# Patient Record
Sex: FEMALE | Race: WHITE | NOT HISPANIC OR LATINO | Employment: FULL TIME | URBAN - METROPOLITAN AREA
[De-identification: names, ages, dates, MRNs, and addresses within clinical notes are randomized per-mention and may not be internally consistent; named-entity substitution may affect disease eponyms.]

---

## 2017-03-11 DIAGNOSIS — F41.9 ANXIETY: ICD-10-CM

## 2017-03-13 RX ORDER — CITALOPRAM HYDROBROMIDE 10 MG/1
TABLET ORAL
Qty: 135 TABLET | Refills: 3 | OUTPATIENT
Start: 2017-03-13

## 2017-03-13 NOTE — TELEPHONE ENCOUNTER
Citalopram 10mg      Last Written Prescription Date:  7/19/16  Last Fill Quantity: 135,   # refills: 3  Last Office Visit with [facility] primary care provider:  7/15/16  Future Office visit: none    Refill request too soon, Rx denied.

## 2017-09-20 ENCOUNTER — OFFICE VISIT (OUTPATIENT)
Dept: OBGYN | Facility: CLINIC | Age: 47
End: 2017-09-20
Attending: OBSTETRICS & GYNECOLOGY
Payer: COMMERCIAL

## 2017-09-20 ENCOUNTER — RADIANT APPOINTMENT (OUTPATIENT)
Dept: MAMMOGRAPHY | Facility: CLINIC | Age: 47
End: 2017-09-20
Attending: OBSTETRICS & GYNECOLOGY
Payer: COMMERCIAL

## 2017-09-20 VITALS
BODY MASS INDEX: 21.97 KG/M2 | SYSTOLIC BLOOD PRESSURE: 110 MMHG | HEIGHT: 67 IN | WEIGHT: 140 LBS | DIASTOLIC BLOOD PRESSURE: 64 MMHG

## 2017-09-20 DIAGNOSIS — Z01.419 ENCOUNTER FOR GYNECOLOGICAL EXAMINATION WITHOUT ABNORMAL FINDING: Primary | ICD-10-CM

## 2017-09-20 DIAGNOSIS — Z13.29 SCREENING FOR THYROID DISORDER: ICD-10-CM

## 2017-09-20 DIAGNOSIS — G25.81 RESTLESS LEG SYNDROME: ICD-10-CM

## 2017-09-20 DIAGNOSIS — Z12.31 VISIT FOR SCREENING MAMMOGRAM: ICD-10-CM

## 2017-09-20 DIAGNOSIS — F41.9 ANXIETY: ICD-10-CM

## 2017-09-20 DIAGNOSIS — N92.0 POLYMENORRHEA: ICD-10-CM

## 2017-09-20 DIAGNOSIS — E55.9 VITAMIN D DEFICIENCY: ICD-10-CM

## 2017-09-20 DIAGNOSIS — Z13.6 SCREENING FOR CARDIOVASCULAR CONDITION: ICD-10-CM

## 2017-09-20 DIAGNOSIS — Z13.228 SCREENING FOR METABOLIC DISORDER: ICD-10-CM

## 2017-09-20 LAB
ERYTHROCYTE [DISTWIDTH] IN BLOOD BY AUTOMATED COUNT: 12.5 % (ref 10–15)
HCT VFR BLD AUTO: 38.6 % (ref 35–47)
HGB BLD-MCNC: 13.1 G/DL (ref 11.7–15.7)
MCH RBC QN AUTO: 31.3 PG (ref 26.5–33)
MCHC RBC AUTO-ENTMCNC: 33.9 G/DL (ref 31.5–36.5)
MCV RBC AUTO: 92 FL (ref 78–100)
PLATELET # BLD AUTO: 203 10E9/L (ref 150–450)
RBC # BLD AUTO: 4.18 10E12/L (ref 3.8–5.2)
WBC # BLD AUTO: 9.9 10E9/L (ref 4–11)

## 2017-09-20 PROCEDURE — 99396 PREV VISIT EST AGE 40-64: CPT | Performed by: OBSTETRICS & GYNECOLOGY

## 2017-09-20 PROCEDURE — 83540 ASSAY OF IRON: CPT | Performed by: OBSTETRICS & GYNECOLOGY

## 2017-09-20 PROCEDURE — 83550 IRON BINDING TEST: CPT | Performed by: OBSTETRICS & GYNECOLOGY

## 2017-09-20 PROCEDURE — 82728 ASSAY OF FERRITIN: CPT | Performed by: OBSTETRICS & GYNECOLOGY

## 2017-09-20 PROCEDURE — 80061 LIPID PANEL: CPT | Performed by: OBSTETRICS & GYNECOLOGY

## 2017-09-20 PROCEDURE — 85027 COMPLETE CBC AUTOMATED: CPT | Performed by: OBSTETRICS & GYNECOLOGY

## 2017-09-20 PROCEDURE — 84443 ASSAY THYROID STIM HORMONE: CPT | Performed by: OBSTETRICS & GYNECOLOGY

## 2017-09-20 PROCEDURE — 80053 COMPREHEN METABOLIC PANEL: CPT | Performed by: OBSTETRICS & GYNECOLOGY

## 2017-09-20 PROCEDURE — 36415 COLL VENOUS BLD VENIPUNCTURE: CPT | Performed by: OBSTETRICS & GYNECOLOGY

## 2017-09-20 PROCEDURE — G0202 SCR MAMMO BI INCL CAD: HCPCS | Mod: TC

## 2017-09-20 PROCEDURE — 82306 VITAMIN D 25 HYDROXY: CPT | Performed by: OBSTETRICS & GYNECOLOGY

## 2017-09-20 PROCEDURE — 77063 BREAST TOMOSYNTHESIS BI: CPT | Mod: TC

## 2017-09-20 RX ORDER — CITALOPRAM HYDROBROMIDE 10 MG/1
TABLET ORAL
Qty: 135 TABLET | Refills: 3 | Status: SHIPPED | OUTPATIENT
Start: 2017-09-20 | End: 2018-12-12

## 2017-09-20 ASSESSMENT — PATIENT HEALTH QUESTIONNAIRE - PHQ9
SUM OF ALL RESPONSES TO PHQ QUESTIONS 1-9: 1
5. POOR APPETITE OR OVEREATING: NOT AT ALL

## 2017-09-20 ASSESSMENT — ANXIETY QUESTIONNAIRES
1. FEELING NERVOUS, ANXIOUS, OR ON EDGE: NOT AT ALL
6. BECOMING EASILY ANNOYED OR IRRITABLE: NOT AT ALL
IF YOU CHECKED OFF ANY PROBLEMS ON THIS QUESTIONNAIRE, HOW DIFFICULT HAVE THESE PROBLEMS MADE IT FOR YOU TO DO YOUR WORK, TAKE CARE OF THINGS AT HOME, OR GET ALONG WITH OTHER PEOPLE: NOT DIFFICULT AT ALL
5. BEING SO RESTLESS THAT IT IS HARD TO SIT STILL: NOT AT ALL
GAD7 TOTAL SCORE: 0
2. NOT BEING ABLE TO STOP OR CONTROL WORRYING: NOT AT ALL
7. FEELING AFRAID AS IF SOMETHING AWFUL MIGHT HAPPEN: NOT AT ALL
3. WORRYING TOO MUCH ABOUT DIFFERENT THINGS: NOT AT ALL

## 2017-09-20 NOTE — MR AVS SNAPSHOT
"              After Visit Summary   9/20/2017    Mariana Peters    MRN: [id number]           Patient Information     Date Of Birth          1970        Visit Information        Provider Department      9/20/2017 11:20 AM Elba Worthy MD [facility]        Today's Diagnoses     Encounter for gynecological examination without abnormal finding    -  1    Anxiety        Polymenorrhea        Vitamin D deficiency        Restless leg syndrome        Screening for cardiovascular condition        Screening for metabolic disorder        Screening for thyroid disorder           Follow-ups after your visit        Who to contact     If you have questions or need follow up information about today's clinic visit or your schedule please contact [facility] directly at [phone].  Normal or non-critical lab and imaging results will be communicated to you by MyChart, letter or phone within 4 business days after the clinic has received the results. If you do not hear from us within 7 days, please contact the clinic through MyChart or phone. If you have a critical or abnormal lab result, we will notify you by phone as soon as possible.  Submit refill requests through [employer] or call your pharmacy and they will forward the refill request to us. Please allow 3 business days for your refill to be completed.          Additional Information About Your Visit        MyChart Information     [employer] lets you send messages to your doctor, view your test results, renew your prescriptions, schedule appointments and more. To sign up, go to [email or web address] . Click on \"Log in\" on the left side of the screen, which will take you to the Welcome page. Then click on \"Sign up Now\" on the right side of the page.     You will be asked to enter the access code listed below, as well as some personal information. Please follow the directions to create your username and password.     Your access " "code is: [email or web address]  Expires: 2017  6:37 PM     Your access code will  in 90 days. If you need help or a new code, please call your [facility] or [phone].        Care EveryWhere ID     This is your Care EveryWhere ID. This could be used by other organizations to access your [facility] medical records  [id number]        Your Vitals Were     Height Last Period BMI (Body Mass Index)             5' 7\" (1.702 m) 2017 (Exact Date) 21.93 kg/m2          Blood Pressure from Last 3 Encounters:   17 110/64   07/15/16 108/60    Weight from Last 3 Encounters:   17 140 lb (63.5 kg)   07/15/16 137 lb (62.1 kg)              We Performed the Following     CBC with platelets     Comprehensive metabolic panel     Ferritin     Iron and iron binding capacity     Lipid panel reflex to direct LDL     TSH with Free T4 Reflex     Vitamin D Deficiency          Today's Medication Changes          These changes are accurate as of: 17 11:59 PM.  If you have any questions, ask your nurse or doctor.               These medicines have changed or have updated prescriptions.        Dose/Directions    citalopram 10 MG tablet   Commonly known as:  celeXA   This may have changed:  See the new instructions.   Used for:  Anxiety   Changed by:  Elba Worthy MD        TAKE ONE AND ONE-HALF TABLETS BY MOUTH DAILY.   Quantity:  135 tablet   Refills:  3       metroNIDAZOLE 0.75 % cream   Commonly known as:  METROCREAM   This may have changed:  Another medication with the same name was removed. Continue taking this medication, and follow the directions you see here.   Changed by:  Elba Worthy MD        Refills:  0         Stop taking these medicines if you haven't already. Please contact your care team if you have questions.     Azelaic Acid 15 % gel   Stopped by:  Elba Worthy MD                Where to get your medicines      These medications were sent to [facility]/pharmacy #[id number] - [place], [place] - " [address]  [address], [place] [address]     Phone:  [phone]     citalopram 10 MG tablet                Primary Care Provider    None Specified       No primary provider on file.        Equal Access to Services     FERN JUAREZ : Hadii aad ku hadkaylee Sonena, waaxda luqadaha, qaybta kaalmada adeegyada, josé miguel parham rororavi camara kris cook. So [facility] [phone].    ATENCIÓN: Si habla español, tiene a canchola disposición servicios gratuitos de asistencia lingüística. Llame al [phone].    We comply with applicable federal civil rights laws and [place] laws. We do not discriminate on the basis of race, color, national origin, age, disability sex, sexual orientation or gender identity.            Thank you!     Thank you for choosing [facility]  for your care. Our goal is always to provide you with excellent care. Hearing back from our patients is one way we can continue to improve our services. Please take a few minutes to complete the written survey that you may receive in the mail after your visit with us. Thank you!             Your Updated Medication List - Protect others around you: Learn how to safely use, store and throw away your medicines at www.disposemymeds.org.          This list is accurate as of: 9/20/17 11:59 PM.  Always use your most recent med list.                   Brand Name Dispense Instructions for use Diagnosis    CALCIUM-VITAMIN D PO           citalopram 10 MG tablet    celeXA    135 tablet    TAKE ONE AND ONE-HALF TABLETS BY MOUTH DAILY.    Anxiety       MAGNESIUM PO           metroNIDAZOLE 0.75 % cream    METROCREAM          PROBIOTIC PO           VITAMIN B6 PO           VITAMIN C PO           VITAMIN D (CHOLECALCIFEROL) PO      Take 1,000 Units by mouth daily

## 2017-09-20 NOTE — PROGRESS NOTES
Mariana is a 46 year old  female who presents for annual exam.     Besides routine health maintenance,  she would like to discuss irregular periods.    HPI:  The patient doesn't have PCP.   Patient is doing a sabbatical from work right now and loving it. 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 Will do a little part time work next year and then start back when the  school year starts.    Is working out regularly, taking time to relax and connect with her sons and . Is doing a low carb challenge through her gym. No in order to lose weight but more b/c its really informative and she's learning a lot and is just being more aware of what she's eating.    Her anxiety is very well under control on her low dose citalopram and really prefers to just stay on it b/c in a really good spot with it.    Periods are coming every 21 days and had one or two that were only 19 days apart. Last for a full week. A day or two are heavy and then just spotting. Can deal with it if 21-23 days but was happening so close together and lasting a full week that almost had no break. Is a vegetarian so doesn't eat meat. Has been having RLS sx recently. Though it seems to be a bit better with magnesium and some of the supplements she's doing right now. Has no sx of fatigue or other issues but has had low vitamin D before so would like to have that checked as well.          GYNECOLOGIC HISTORY:    Patient's last menstrual period was 2017 (exact date).  Her current contraception method is: vasectomy.  She  reports that she has never smoked. She has never used smokeless tobacco.      Patient is sexually active.  STD testing offered?  Declined  Last PHQ-9 score on record =   PHQ-9 SCORE 2017   Total Score 1     Last GAD7 score on record =   TATIANA-7 SCORE 2017   Total Score 0     Alcohol Score = 4    HEALTH MAINTENANCE:  Cholesterol: None found.  Last  Mammo: 07/15/16, Result: normal, Next Mammo: today   Pap: 13 wnl, HPV-  Colonoscopy:  Never, Result: not applicable, Next Colonoscopy: 6 years.  Dexa:  Never    Health maintenance updated:  yes    HISTORY:  Obstetric History       T1      L2     SAB0   TAB0   Ectopic0   Multiple0   Live Births2       # Outcome Date GA Lbr Grant/2nd Weight Sex Delivery Anes PTL Lv   2 Term 04 39w0d  7 lb 10 oz (3.459 kg) M -SEC  N KELLE      Name: Oneil Smith  10/09/01 36w0d  6 lb 2 oz (2.778 kg) M -SEC  Y KELLE      Name: Hebert      Complications: Fetal Intolerance          Patient Active Problem List   Diagnosis     Rosacea     Anxiety     Past Surgical History:   Procedure Laterality Date      SECTION       CHOLECYSTECTOMY       HEMORRHOIDECTOMY       HERNIA REPAIR        Social History   Substance Use Topics     Smoking status: Never Smoker     Smokeless tobacco: Never Used     Alcohol use 0.0 oz/week     0 Standard drinks or equivalent per week      Problem (# of Occurrences) Relation (Name,Age of Onset)    Colon Cancer (1) Paternal Grandmother    DIABETES (1) Mother    Hyperlipidemia (1) Father    Hypertension (1) Father    Other - See Comments (1) Mother: hip fx            Current Outpatient Prescriptions   Medication Sig     metroNIDAZOLE (METROCREAM) 0.75 % cream      Pyridoxine HCl (VITAMIN B6 PO)      Probiotic Product (PROBIOTIC PO)      MAGNESIUM PO      Ascorbic Acid (VITAMIN C PO)      CALCIUM-VITAMIN D PO      citalopram (CELEXA) 10 MG tablet TAKE ONE AND ONE-HALF TABLETS BY MOUTH DAILY.     VITAMIN D, CHOLECALCIFEROL, PO Take 1,000 Units by mouth daily     No current facility-administered medications for this visit.      No Known Allergies    Past medical, surgical, social and family histories were reviewed and updated in EPIC.    ROS:   12 point review of systems negative other than symptoms noted below.  Breast: Tenderness  Genitourinary: Cramps, Heavy  "Bleeding with Period and Night Sweats  Endocrine: Decreased Libido  Psychiatric: Difficulty Sleeping    EXAM:  /64  Ht 5' 7\" (1.702 m)  Wt 140 lb (63.5 kg)  LMP 09/12/2017 (Exact Date)  BMI 21.93 kg/m2   BMI: Body mass index is 21.93 kg/(m^2).    PHYSICAL EXAM:  Constitutional:  Appearance: Well nourished, well developed, alert, in no acute distress  Neck:  Lymph Nodes:  No lymphadenopathy present    Thyroid:  Gland size normal, nontender, no nodules or masses present  on palpation  Chest:  Respiratory Effort:  Breathing unlabored  Cardiovascular:    Heart: Auscultation:  Regular rate, normal rhythm, no murmurs present  Breasts: Palpation of Breasts and Axillae:  No masses present on palpation, no breast tenderness. and No nodularity, asymmetry or nipple discharge bilaterally.  Gastrointestinal:   Abdominal Examination:  Abdomen nontender to palpation, tone normal without rigidity or guarding, no masses present, umbilicus without lesions   Liver and Spleen:  No hepatomegaly present, liver nontender to palpation    Hernias:  No hernias present  Lymphatic: Lymph Nodes:  No other lymphadenopathy present  Skin:  General Inspection:  No rashes present, no lesions present, no areas of  discoloration    Genitalia and Groin:  No rashes present, no lesions present, no areas of  discoloration, no masses present  Neurologic/Psychiatric:    Mental Status:  Oriented X3     Pelvic Exam:  External Genitalia:     Normal appearance for age, no discharge present, no tenderness present, no inflammatory lesions present, color normal  Vagina:     Normal vaginal vault without central or paravaginal defects, no discharge present, no inflammatory lesions present, no masses present  Bladder:     Nontender to palpation  Urethra:   Urethral Body:  Urethra palpation normal, urethra structural support normal   Urethral Meatus:  No erythema or lesions present  Cervix:     Appearance healthy, no lesions present, nontender to palpation, " no bleeding present  Uterus:     Uterus: firm, normal sized and nontender, anteverted in position.   Adnexa:     No adnexal tenderness present, no adnexal masses present  Perineum:     Perineum within normal limits, no evidence of trauma, no rashes or skin lesions present  Anus:     Anus within normal limits, no hemorrhoids present  Inguinal Lymph Nodes:     No lymphadenopathy present  Pubic Hair:     Normal pubic hair distribution for age  Genitalia and Groin:     No rashes present, no lesions present, no areas of discoloration, no masses present      COUNSELING:   Reviewed preventive health counseling, as reflected in patient instructions    BMI: Body mass index is 21.93 kg/(m^2).        ASSESSMENT:  46 year old female with satisfactory annual exam.  ICD-10-CM    1. Encounter for gynecological examination without abnormal finding Z01.419    2. Anxiety F41.9 citalopram (CELEXA) 10 MG tablet   3. Polymenorrhea N92.0    4. Vitamin D deficiency E55.9 Vitamin D Deficiency   5. Restless leg syndrome G25.81 CBC with platelets     Iron and iron binding capacity     Ferritin   6. Screening for cardiovascular condition Z13.6 Lipid panel reflex to direct LDL   7. Screening for metabolic disorder Z13.228 Comprehensive metabolic panel   8. Screening for thyroid disorder Z13.29 TSH with Free T4 Reflex       PLAN:  Pap is due next year and is having her mammo today    Will check her CBC and iron studies to make sure that the frequent and week long menses aren't leading to iron deficiency and anemia which can contribute to RLS as well. If normal then can live with the periods as they are at this point and just more of an inconvenience.    If normal iron and still getting RLS would encourage hydration, heat/calf massage prior to bed, and possibly could try benadryl as well. Will also check a vitamin D to make sure that's ok.    Refill sent on citalopram for the year.    Elba Worthy MD

## 2017-09-21 LAB
ALBUMIN SERPL-MCNC: 4.3 G/DL (ref 3.4–5)
ALP SERPL-CCNC: 57 U/L (ref 40–150)
ALT SERPL W P-5'-P-CCNC: 24 U/L (ref 0–50)
ANION GAP SERPL CALCULATED.3IONS-SCNC: 9 MMOL/L (ref 3–14)
AST SERPL W P-5'-P-CCNC: 16 U/L (ref 0–45)
BILIRUB SERPL-MCNC: 1 MG/DL (ref 0.2–1.3)
BUN SERPL-MCNC: 13 MG/DL (ref 7–30)
CALCIUM SERPL-MCNC: 8.8 MG/DL (ref 8.5–10.1)
CHLORIDE SERPL-SCNC: 106 MMOL/L (ref 94–109)
CHOLEST SERPL-MCNC: 175 MG/DL
CO2 SERPL-SCNC: 24 MMOL/L (ref 20–32)
CREAT SERPL-MCNC: 0.73 MG/DL (ref 0.52–1.04)
DEPRECATED CALCIDIOL+CALCIFEROL SERPL-MC: 36 UG/L (ref 20–75)
FERRITIN SERPL-MCNC: 35 NG/ML (ref 12–150)
GFR SERPL CREATININE-BSD FRML MDRD: 87 ML/MIN/1.7M2
GLUCOSE SERPL-MCNC: 77 MG/DL (ref 70–99)
HDLC SERPL-MCNC: 72 MG/DL
IRON SATN MFR SERPL: 34 % (ref 15–46)
IRON SERPL-MCNC: 107 UG/DL (ref 35–180)
LDLC SERPL CALC-MCNC: 94 MG/DL
NONHDLC SERPL-MCNC: 103 MG/DL
POTASSIUM SERPL-SCNC: 4.4 MMOL/L (ref 3.4–5.3)
PROT SERPL-MCNC: 7.3 G/DL (ref 6.8–8.8)
SODIUM SERPL-SCNC: 139 MMOL/L (ref 133–144)
TIBC SERPL-MCNC: 316 UG/DL (ref 240–430)
TRIGL SERPL-MCNC: 47 MG/DL
TSH SERPL DL<=0.005 MIU/L-ACNC: 1.33 MU/L (ref 0.4–4)

## 2017-09-21 ASSESSMENT — ANXIETY QUESTIONNAIRES: GAD7 TOTAL SCORE: 0

## 2018-02-07 ENCOUNTER — TELEPHONE (OUTPATIENT)
Dept: NURSING | Facility: CLINIC | Age: 48
End: 2018-02-07

## 2018-02-07 NOTE — TELEPHONE ENCOUNTER
"Received form \"Response Requested: Alternative Requested\" from [facility] for Citalopram HBR 10 mg tablet (take 1.5 tabs by mouth daily):      \"Alternative requested: Plane limits per insurance; max of 1 tab/day. No other info given to pharm. Please call insurance [phone]. ID# [id number]. Thanks\"        Routing to JARRETT Kiran.    "

## 2018-02-08 NOTE — TELEPHONE ENCOUNTER
PA Initiation    Medication: celexa 10mg tab - INITIATED  Insurance Company: [employer] [place] - Phone [phone] Fax [phone]  Pharmacy Filling the Rx: [id number] - [place], [place] - [address]  Filling Pharmacy Phone: [phone]  Filling Pharmacy Fax:    Start Date: 2/8/2018

## 2018-02-08 NOTE — TELEPHONE ENCOUNTER
Pharmacist Autumn calling to ask about where this process is at for the pt. Informed it was sent to you ANA PRESCOTT. She stated understanding and will await response.

## 2018-02-09 NOTE — TELEPHONE ENCOUNTER
Prior Authorization Approval    Authorization Effective Date: 1/30/2018  Authorization Expiration Date: 2/6/2019  Medication: celexa 10mg tab - pa approved   Reference #: certification number- [id number]   Insurance Company: [employer] [place] - Phone [phone] Fax [phone]  Expected CoPay: 25.65      Which Pharmacy is filling the prescription (Not needed for infusion/clinic administered): [id number] - [place], [place] - [address]  Pharmacy Notified: Yes  Patient Notified: Yes

## 2018-12-12 DIAGNOSIS — F41.9 ANXIETY: ICD-10-CM

## 2018-12-13 RX ORDER — CITALOPRAM HYDROBROMIDE 10 MG/1
TABLET ORAL
Qty: 45 TABLET | Refills: 0 | Status: SHIPPED | OUTPATIENT
Start: 2018-12-13 | End: 2019-01-16

## 2018-12-13 NOTE — TELEPHONE ENCOUNTER
"Requested Prescriptions   Pending Prescriptions Disp Refills     citalopram (CELEXA) 10 MG tablet [Pharmacy Med Name: CITALOPRAM 10MG TABLETS] 135 tablet 0     Sig: TAKE 1 AND 1/2 TABLET BY MOUTH DAILY    SSRIs Protocol Failed - 12/12/2018  5:34 PM       Failed - Recent (12 mo) or future (30 days) visit within the authorizing provider's specialty    Patient had office visit in the last 12 months or has a visit in the next 30 days with authorizing provider or within the authorizing provider's specialty.  See \"Patient Info\" tab in inbasket, or \"Choose Columns\" in Meds & Orders section of the refill encounter.             Passed - Patient is age 18 or older       Passed - No active pregnancy on record       Passed - No positive pregnancy test in last 12 months        Last Written Prescription Date:  9/20/2017  Last Fill Quantity: 135,  # refills: 3   Last office visit: No previous visit found with prescribing provider:  Zaynab   Future Office Visit: none scheduled    Medication is being filled for 1 time refill only due to:  Patient needs to be seen because it has been more than one year since last visit.   Called pt and left detailed vm reminding pt overdue for annual exam and med check and to call clinic and speak with a  to schedule exam. 1 month refill sent to pharmacy.      "

## 2019-01-15 DIAGNOSIS — F41.9 ANXIETY: ICD-10-CM

## 2019-01-15 RX ORDER — CITALOPRAM HYDROBROMIDE 10 MG/1
TABLET ORAL
Qty: 45 TABLET | Refills: 0 | OUTPATIENT
Start: 2019-01-15

## 2019-01-15 NOTE — TELEPHONE ENCOUNTER
"Requested Prescriptions   Pending Prescriptions Disp Refills     citalopram (CELEXA) 10 MG tablet [Pharmacy Med Name: CITALOPRAM 10MG TABLETS] 45 tablet 0     Sig: TAKE 1 AND 1/2 TABLETS BY MOUTH DAILY    SSRIs Protocol Passed - 1/15/2019  4:34 PM       Passed - Recent (12 mo) or future (30 days) visit within the authorizing provider's specialty    Patient had office visit in the last 12 months or has a visit in the next 30 days with authorizing provider or within the authorizing provider's specialty.  See \"Patient Info\" tab in inbasket, or \"Choose Columns\" in Meds & Orders section of the refill encounter.             Passed - Medication is active on med list       Passed - Patient is age 18 or older       Passed - No active pregnancy on record       Passed - No positive pregnancy test in last 12 months      Last Written Prescription Date:  12/13/18  Last Fill Quantity: 45,  # refills: 0   Last office visit: No previous visit found with prescribing provider:  9/20/17   Future Office Visit:   Next 5 appointments (look out 90 days)    Jan 16, 2019 10:30 AM CST  PHYSICAL with Elba Worthy MD  [facility] ([facility]) [address] [place] [id number] [phone]       Pt has appointment 1/16/19. Refill denied.   "

## 2019-01-15 NOTE — PROGRESS NOTES
"  Mariana is a 48 year old  female who presents for annual exam.     Besides routine health maintenance,  she would like to discuss irr periods. .    HPI:  The patient's PCP is  No primary care provider on file..    Patient is doing very well other than period irregularity. Periods are on a \"rolling average of 25 days\" will have one cycle that's 19 days and then 29 days. Today is bleeding and it's only day 14 so not sure if this is a period or ovulation bleeding. Occasionally some night sweats but no daytime hot flashes. Sister went through menopause around 52 and didn't c/o vasomotor sx that much. Patient's mom was 42 and her periods just stopped but minimal sx. Patient is 10 yrs younger than her 4 other siblings so wasn't planned but then her mom had early menopause. Not overly worried about the periods as long as not a sign of uterine cancer.    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 Now back to [facility]. 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 Loves the whole spectrum.    Still working out regularly. Does a lot of lifting and has a lot of core work. Very normal, regular BMs and not constipated. However has a hemorrhoid. Not hurting or bleeding but had a thrombosed one a couple years ago and jsut wants to prevent that from happening    Sons are antonio in HS and 8th grade. One getting ready for HS and the other starting to think about colleges. Not sure if will stay local or not but thinks he'll be relatively close to home      GYNECOLOGIC HISTORY:    Patient's last menstrual period was 2019 (approximate).  Her current contraception method is: vasectomy.  She  reports that  has never smoked. she has never used smokeless tobacco.    Patient is sexually active.  STD testing offered?  Declined  Last PHQ-9 score on record =   PHQ-9 SCORE 2019   PHQ-9 Total Score 0     Last GAD7 score on record =   TATIANA-7 SCORE 2019   Total Score 1     Alcohol " Score = 3    HEALTH MAINTENANCE:  Cholesterol:   Cholesterol   Date Value Ref Range Status   2017 175 <200 mg/dL Final      Last Mammo: one year ago, Result: normal, Next Mammo: today   Pap:   NL HPV-  Colonoscopy:  NA, Result: not applicable, Next Colonoscopy: 4 years.  Dexa:  Never    Health maintenance updated:  yes    HISTORY:  Obstetric History       T1      L2     SAB0   TAB0   Ectopic0   Multiple0   Live Births2       # Outcome Date GA Lbr Grant/2nd Weight Sex Delivery Anes PTL Lv   2 Term 04 39w0d  3.459 kg (7 lb 10 oz) M -SEC  N KELLE      Name: Oneil Smith  10/09/01 36w0d  2.778 kg (6 lb 2 oz) M -SEC  Y KELLE      Name: Hebert      Complications: Fetal Intolerance          Patient Active Problem List   Diagnosis     Rosacea     Anxiety     Past Surgical History:   Procedure Laterality Date      SECTION       CHOLECYSTECTOMY       HEMORRHOIDECTOMY       HERNIA REPAIR        Social History     Tobacco Use     Smoking status: Never Smoker     Smokeless tobacco: Never Used   Substance Use Topics     Alcohol use: Yes     Alcohol/week: 0.0 oz      Problem (# of Occurrences) Relation (Name,Age of Onset)    Colon Cancer (1) Paternal Grandmother    Diabetes (1) Mother    Hyperlipidemia (1) Father    Hypertension (1) Father    Other - See Comments (1) Mother: hip fx            Current Outpatient Medications   Medication Sig     Ascorbic Acid (VITAMIN C PO)      CALCIUM-VITAMIN D PO      citalopram (CELEXA) 10 MG tablet TAKE 1 AND 1/2 TABLET BY MOUTH DAILY     hydrocortisone (ANUSOL-HC) 2.5 % cream Place rectally 2 times daily as needed for hemorrhoids     MAGNESIUM PO      metroNIDAZOLE (METROCREAM) 0.75 % cream      Probiotic Product (PROBIOTIC PO)      vitamin B-12 (CYANOCOBALAMIN) 100 MCG tablet Take 1,000 mcg by mouth daily     Pyridoxine HCl (VITAMIN B6 PO)      VITAMIN D, CHOLECALCIFEROL, PO Take 1,000 Units by mouth daily     No current  "facility-administered medications for this visit.      No Known Allergies    Past medical, surgical, social and family histories were reviewed and updated in EPIC.    ROS:   12 point review of systems negative other than symptoms noted below.  Head: Sore Throat  Genitourinary: Irregular Menses and Spotting    EXAM:  /78   Ht 1.695 m (5' 6.75\")   Wt 64.4 kg (142 lb)   LMP 01/02/2019 (Approximate)   Breastfeeding? No   BMI 22.41 kg/m     BMI: Body mass index is 22.41 kg/m .    PHYSICAL EXAM:  Constitutional:  Appearance: Well nourished, well developed, alert, in no acute distress  Neck:  Lymph Nodes:  No lymphadenopathy present    Thyroid:  Gland size normal, nontender, no nodules or masses present  on palpation  Chest:  Respiratory Effort:  Breathing unlabored  Cardiovascular:    Heart: Auscultation:  Regular rate, normal rhythm, no murmurs present  Breasts: Palpation of Breasts and Axillae:  No masses present on palpation, no breast tenderness. and No nodularity, asymmetry or nipple discharge bilaterally.  Gastrointestinal:   Abdominal Examination:  Abdomen nontender to palpation, tone normal without rigidity or guarding, no masses present, umbilicus without lesions   Liver and Spleen:  No hepatomegaly present, liver nontender to palpation    Hernias:  No hernias present  Lymphatic: Lymph Nodes:  No other lymphadenopathy present  Skin:  General Inspection:  No rashes present, no lesions present, no areas of  discoloration    Genitalia and Groin:  No rashes present, no lesions present, no areas of  discoloration, no masses present  Neurologic/Psychiatric:    Mental Status:  Oriented X3     Pelvic Exam:  External Genitalia:     Normal appearance for age, no discharge present, no tenderness present, no inflammatory lesions present, color normal  Vagina:     Normal vaginal vault without central or paravaginal defects, no discharge present, no inflammatory lesions present, no masses present  Bladder:  "    Nontender to palpation  Urethra:   Urethral Body:  Urethra palpation normal, urethra structural support normal   Urethral Meatus:  No erythema or lesions present  Cervix:     Appearance healthy, no lesions present, nontender to palpation, MENSTRUAL LEVEL bleeding present  Uterus:     Uterus: firm, normal sized and nontender, anteverted in position.   Adnexa:     No adnexal tenderness present, no adnexal masses present  Perineum:     Perineum within normal limits, no evidence of trauma, no rashes or skin lesions present  Anus:     Anus within normal limits, EXTERNAL, NONTHROMBOSED HEMORRHOID AT 7 OCLOCK  Inguinal Lymph Nodes:     No lymphadenopathy present  Pubic Hair:     Normal pubic hair distribution for age  Genitalia and Groin:     No rashes present, no lesions present, no areas of discoloration, no masses present      COUNSELING:   Reviewed preventive health counseling, as reflected in patient instructions  Special attention given to:        (Radha)menopause management    BMI: Body mass index is 22.41 kg/m .      ASSESSMENT:  48 year old female with satisfactory annual exam.    ICD-10-CM    1. Encounter for gynecological examination without abnormal finding Z01.419 Pap imaged thin layer screen with HPV - recommended age 30 - 65     HPV High Risk Types DNA Cervical   2. Anxiety F41.9 citalopram (CELEXA) 10 MG tablet   3. External hemorrhoids K64.4 hydrocortisone (ANUSOL-HC) 2.5 % cream   4. Lipid screening Z13.220    5. Screening for metabolic disorder Z13.228        PLAN:  Pap and cotesting done today  mammo today  Patient is doing alternating 20mg and 10mg citalopram rather than cutting them in 1/2 to do a 15mg daily average and happy to keep doing that  rx for 2.5% hydrocortisone for the hemorrhoid.   Plan fasting labs in 3-4 more years    Elba Worthy MD

## 2019-01-16 ENCOUNTER — OFFICE VISIT (OUTPATIENT)
Dept: OBGYN | Facility: CLINIC | Age: 49
End: 2019-01-16
Payer: COMMERCIAL

## 2019-01-16 ENCOUNTER — ANCILLARY PROCEDURE (OUTPATIENT)
Dept: MAMMOGRAPHY | Facility: CLINIC | Age: 49
End: 2019-01-16
Payer: COMMERCIAL

## 2019-01-16 VITALS
DIASTOLIC BLOOD PRESSURE: 78 MMHG | WEIGHT: 142 LBS | BODY MASS INDEX: 22.29 KG/M2 | HEIGHT: 67 IN | SYSTOLIC BLOOD PRESSURE: 110 MMHG

## 2019-01-16 DIAGNOSIS — Z01.419 ENCOUNTER FOR GYNECOLOGICAL EXAMINATION WITHOUT ABNORMAL FINDING: Primary | ICD-10-CM

## 2019-01-16 DIAGNOSIS — Z12.31 VISIT FOR SCREENING MAMMOGRAM: ICD-10-CM

## 2019-01-16 DIAGNOSIS — K64.4 EXTERNAL HEMORRHOIDS: ICD-10-CM

## 2019-01-16 DIAGNOSIS — F41.9 ANXIETY: ICD-10-CM

## 2019-01-16 PROCEDURE — 77063 BREAST TOMOSYNTHESIS BI: CPT | Mod: TC

## 2019-01-16 PROCEDURE — 99396 PREV VISIT EST AGE 40-64: CPT | Performed by: OBSTETRICS & GYNECOLOGY

## 2019-01-16 PROCEDURE — G0145 SCR C/V CYTO,THINLAYER,RESCR: HCPCS | Performed by: OBSTETRICS & GYNECOLOGY

## 2019-01-16 PROCEDURE — 87624 HPV HI-RISK TYP POOLED RSLT: CPT | Performed by: OBSTETRICS & GYNECOLOGY

## 2019-01-16 PROCEDURE — 77067 SCR MAMMO BI INCL CAD: CPT | Mod: TC

## 2019-01-16 RX ORDER — CITALOPRAM HYDROBROMIDE 10 MG/1
TABLET ORAL
Qty: 135 TABLET | Refills: 3 | Status: SHIPPED | OUTPATIENT
Start: 2019-01-16 | End: 2019-08-02 | Stop reason: DRUGHIGH

## 2019-01-16 ASSESSMENT — MIFFLIN-ST. JEOR: SCORE: 1312.77

## 2019-01-16 ASSESSMENT — ANXIETY QUESTIONNAIRES
3. WORRYING TOO MUCH ABOUT DIFFERENT THINGS: NOT AT ALL
2. NOT BEING ABLE TO STOP OR CONTROL WORRYING: NOT AT ALL
1. FEELING NERVOUS, ANXIOUS, OR ON EDGE: SEVERAL DAYS
6. BECOMING EASILY ANNOYED OR IRRITABLE: NOT AT ALL
7. FEELING AFRAID AS IF SOMETHING AWFUL MIGHT HAPPEN: NOT AT ALL
5. BEING SO RESTLESS THAT IT IS HARD TO SIT STILL: NOT AT ALL
GAD7 TOTAL SCORE: 1

## 2019-01-16 ASSESSMENT — PATIENT HEALTH QUESTIONNAIRE - PHQ9
SUM OF ALL RESPONSES TO PHQ QUESTIONS 1-9: 0
5. POOR APPETITE OR OVEREATING: NOT AT ALL

## 2019-01-16 NOTE — LETTER
January 25, 2019    Mariana AMADA Cruzes  [address]  [facility] [address]    Dear ,  This letter is regarding your recent Pap smear (cervical cancer screening) and Human Papillomavirus (HPV) test.  We are happy to inform you that your Pap smear result is normal. Cervical cancer is closely linked with certain types of HPV. Your results showed no evidence of high-risk HPV.  Therefore we recommend you return in 5 years for your next pap smear and HPV test.  You will still need to return to the clinic every year for an annual exam and other preventive tests.  If you have additional questions regarding this result, please call our registered nurse, Zaynab at [phone].  Sincerely,    Elba Worthy MD/lorraine

## 2019-01-17 ASSESSMENT — ANXIETY QUESTIONNAIRES: GAD7 TOTAL SCORE: 1

## 2019-01-18 LAB
COPATH REPORT: NORMAL
PAP: NORMAL

## 2019-01-21 LAB
FINAL DIAGNOSIS: NORMAL
HPV HR 12 DNA CVX QL NAA+PROBE: NEGATIVE
HPV16 DNA SPEC QL NAA+PROBE: NEGATIVE
HPV18 DNA SPEC QL NAA+PROBE: NEGATIVE
SPECIMEN DESCRIPTION: NORMAL
SPECIMEN SOURCE CVX/VAG CYTO: NORMAL

## 2019-05-06 ENCOUNTER — TELEPHONE (OUTPATIENT)
Dept: OBGYN | Facility: CLINIC | Age: 49
End: 2019-05-06

## 2019-05-06 DIAGNOSIS — F32.A DEPRESSION: Primary | ICD-10-CM

## 2019-05-06 NOTE — TELEPHONE ENCOUNTER
Prior Authorization Retail Medication Request  RENEWAL:  previously approved from 1/30/18 - 2/6/19    Medication/Dose: citalopram (CELEXA) 10 MG tablet  ICD code (if different than what is on RX):    Previously Tried and Failed:  Lower dose 10mg daily  Rationale:  Anxiety better controlled on 15 mg daily    Insurance Name:  [facility]  Insurance ID:  [id number]

## 2019-05-09 NOTE — TELEPHONE ENCOUNTER
PA Initiation    Medication: citalopram (CELEXA) 10 MG tablet  Insurance Company: [employer] - Phone [phone] Fax [phone]  Pharmacy Filling the Rx: [email or web address] [address] [place] [address] [address] & [facility]  Filling Pharmacy Phone: [phone]  Filling Pharmacy Fax:    Start Date: 5/9/2019    Central Prior Authorization Team   Phone: [phone]

## 2019-05-13 NOTE — TELEPHONE ENCOUNTER
Called Patient s insurance at [phone] and per representative they did receive the request and it is still currently under review. Their turnaround time is 4-5 days but can be up to 10 days but once a decision is made they will send us a fax and mail out a letter to the patient with the outcome as well.

## 2019-05-16 NOTE — TELEPHONE ENCOUNTER
PRIOR AUTHORIZATION DENIED    Medication: citalopram (CELEXA) 10 MG tablet    Denial Date: 5/16/2019    Denial Rational: Insurance only allows 1 per day, stating that this is above what is suggested by the FDA        Appeal Information:

## 2019-05-17 ENCOUNTER — TELEPHONE (OUTPATIENT)
Dept: NURSING | Facility: CLINIC | Age: 49
End: 2019-05-17

## 2019-05-17 RX ORDER — CITALOPRAM HYDROBROMIDE 20 MG/1
20 TABLET ORAL EVERY OTHER DAY
Qty: 45 TABLET | Refills: 0 | Status: SHIPPED | OUTPATIENT
Start: 2019-05-17 | End: 2019-08-02

## 2019-05-17 RX ORDER — CITALOPRAM HYDROBROMIDE 10 MG/1
10 TABLET ORAL EVERY OTHER DAY
Qty: 45 TABLET | Refills: 0 | Status: SHIPPED | OUTPATIENT
Start: 2019-05-17 | End: 2019-08-01

## 2019-05-17 NOTE — TELEPHONE ENCOUNTER
Patient calling for a clarification on her Citalopram Prescription, there are three listed int he system and she needs it clarified which one she should take, having trouble confirming with her pharmacy apparently she has a combination of 10 and 20 mg tabs that are use to make a dose of 15mg daily and this is confusion the pre-authorization process.    RN will flag provider and team in Epic for review, She is requesting a call back to clarify or re-do the pre authorization, she has enough to hold her over until Monday.    Meaghan Sanchez RN - [employer] Nurse Advisor  5/17/2019

## 2019-05-17 NOTE — TELEPHONE ENCOUNTER
LM to clarify RX for Citalopram. Original RX was written 1/6/19 #135 taking 10mg 1 1/2 tabs daily (15mg) and that this RX now needed PA and was denied due to quantity.     Reviewed chart note 1/6/19   Patient is doing alternating 20mg and 10mg citalopram rather than cutting them in 1/2 to do a 15mg daily average and happy to keep doing that.    Routing to Triage RN to send new RX's for 10mg and 20mg with alternating directions to see if able to get through.

## 2019-05-19 NOTE — TELEPHONE ENCOUNTER
This came to me directly from [facility] this weekend.  My note clearly states that patient is doing an alternating 20 mg with 10 mg rather than cutting pills in half and doing 15mg/day  If that's what she wants to keep doing I'm sure the PA is going to be a problem so we need a valid reason for her to be doing this.  O/w she needs to tell us what she wants to do. Just do a daily 10mg, just do a daily 20mg(my rec) or do 15mg in which case then we need to fill a 10mg rx for 135 pills every 3 months

## 2019-08-01 DIAGNOSIS — F32.A DEPRESSION: ICD-10-CM

## 2019-08-02 DIAGNOSIS — F32.A DEPRESSION: ICD-10-CM

## 2019-08-02 RX ORDER — CITALOPRAM HYDROBROMIDE 10 MG/1
TABLET ORAL
Qty: 45 TABLET | Refills: 0 | Status: SHIPPED | OUTPATIENT
Start: 2019-08-02 | End: 2019-11-03

## 2019-08-02 RX ORDER — CITALOPRAM HYDROBROMIDE 20 MG/1
TABLET ORAL
Qty: 45 TABLET | Refills: 1 | Status: SHIPPED | OUTPATIENT
Start: 2019-08-02 | End: 2020-02-03

## 2019-08-02 NOTE — TELEPHONE ENCOUNTER
"Requested Prescriptions   Pending Prescriptions Disp Refills     citalopram (CELEXA) 10 MG tablet [Pharmacy Med Name: CITALOPRAM 10MG TABLETS] 45 tablet 0     Sig: TAKE 1 TABLET BY MOUTH EVERY OTHER DAY ALTERNATING WITH 20 MG       SSRIs Protocol Failed - 8/1/2019  7:23 PM        Failed - PHQ-9 score less than 5 in past 6 months     Please review last PHQ-9 score.           Failed - Recent (6 mo) or future (30 days) visit within the authorizing provider's specialty     Patient had office visit in the last 6 months or has a visit in the next 30 days with authorizing provider or within the authorizing provider's specialty.  See \"Patient Info\" tab in inbasket, or \"Choose Columns\" in Meds & Orders section of the refill encounter.            Passed - Medication is active on med list        Passed - Patient is age 18 or older        Passed - No active pregnancy on record        Passed - No positive pregnancy test in last 12 months        Prescription approved per [facility] Refill Protocol.  Luciana Bo RN on 8/2/2019 at 8:38 AM    "

## 2019-11-03 DIAGNOSIS — F32.A DEPRESSION: ICD-10-CM

## 2019-11-04 ENCOUNTER — HEALTH MAINTENANCE LETTER (OUTPATIENT)
Age: 49
End: 2019-11-04

## 2019-11-04 RX ORDER — CITALOPRAM HYDROBROMIDE 10 MG/1
TABLET ORAL
Qty: 45 TABLET | Refills: 0 | Status: SHIPPED | OUTPATIENT
Start: 2019-11-04 | End: 2020-02-03

## 2019-11-04 NOTE — TELEPHONE ENCOUNTER
"Requested Prescriptions   Pending Prescriptions Disp Refills     citalopram (CELEXA) 10 MG tablet [Pharmacy Med Name: CITALOPRAM 10MG TABLETS] 45 tablet 0     Sig: TAKE 1 TABLET BY MOUTH EVERY OTHER DAY ALTERNATING WITH 20 MG       SSRIs Protocol Failed - 11/3/2019  1:14 PM        Failed - PHQ-9 score less than 5 in past 6 months     Please review last PHQ-9 score.           Failed - Recent (6 mo) or future (30 days) visit within the authorizing provider's specialty     Patient had office visit in the last 6 months or has a visit in the next 30 days with authorizing provider or within the authorizing provider's specialty.  See \"Patient Info\" tab in inbasket, or \"Choose Columns\" in Meds & Orders section of the refill encounter.            Passed - Medication is active on med list        Passed - Patient is age 18 or older        Passed - No active pregnancy on record        Passed - No positive pregnancy test in last 12 months        Last Written Prescription Date:  8/2/19  Last Fill Quantity: 45,  # refills: 1   Last office visit: 1/16/2019 with prescribing provider:  Elba Worthy   Future Office Visit:  none    Prescription approved per [facility] Refill Protocol.  Luciana Bo RN on 11/4/2019 at 12:41 PM    "

## 2020-02-02 DIAGNOSIS — F32.A DEPRESSION: ICD-10-CM

## 2020-02-03 RX ORDER — CITALOPRAM HYDROBROMIDE 20 MG/1
TABLET ORAL
Qty: 45 TABLET | Refills: 0 | Status: SHIPPED | OUTPATIENT
Start: 2020-02-03 | End: 2020-05-03

## 2020-02-03 RX ORDER — CITALOPRAM HYDROBROMIDE 10 MG/1
TABLET ORAL
Qty: 45 TABLET | Refills: 0 | Status: SHIPPED | OUTPATIENT
Start: 2020-02-03 | End: 2020-05-03

## 2020-02-03 NOTE — TELEPHONE ENCOUNTER
"Requested Prescriptions   Pending Prescriptions Disp Refills     citalopram (CELEXA) 10 MG tablet [Pharmacy Med Name: CITALOPRAM 10MG TABLETS] 45 tablet 0     Sig: TAKE 1 TABLET BY MOUTH EVERY OTHER DAY ALTERNATING WITH 20 MG       SSRIs Protocol Failed - 2/2/2020  3:12 AM        Failed - PHQ-9 score less than 5 in past 6 months     Please review last PHQ-9 score.           Failed - Recent (6 mo) or future (30 days) visit within the authorizing provider's specialty     Patient had office visit in the last 6 months or has a visit in the next 30 days with authorizing provider or within the authorizing provider's specialty.  See \"Patient Info\" tab in inbasket, or \"Choose Columns\" in Meds & Orders section of the refill encounter.            Passed - Medication is active on med list        Passed - Patient is age 18 or older        Passed - No active pregnancy on record        Passed - No positive pregnancy test in last 12 months        citalopram (CELEXA) 20 MG tablet [Pharmacy Med Name: CITALOPRAM 20MG TABLETS] 45 tablet 1     Sig: TAKE 1 TABLET BY MOUTH EVERY OTHER DAY ALTERNATING WITH 10 MG       SSRIs Protocol Failed - 2/2/2020  3:12 AM        Failed - PHQ-9 score less than 5 in past 6 months     Please review last PHQ-9 score.           Failed - Recent (6 mo) or future (30 days) visit within the authorizing provider's specialty     Patient had office visit in the last 6 months or has a visit in the next 30 days with authorizing provider or within the authorizing provider's specialty.  See \"Patient Info\" tab in inbasket, or \"Choose Columns\" in Meds & Orders section of the refill encounter.            Passed - Medication is active on med list        Passed - Patient is age 18 or older        Passed - No active pregnancy on record        Passed - No positive pregnancy test in last 12 months          citalopram (CELEXA) 10 MG tablet [Pharmacy Med Name: CITALOPRAM 10MG TABLETS]     Last Written Prescription Date:  " 11/04/2019  Last Fill Quantity: 45,  # refills: 0   Last office visit: 1/16/2019 with prescribing provider:  Dr. Worthy   Future Office Visit:        citalopram (CELEXA) 20 MG tablet [Pharmacy Med Name: CITALOPRAM 20MG TABLETS]   Last Written Prescription Date:  08/02/2019  Last Fill Quantity: 45,  # refills: 1   Last office visit: 1/16/2019 with prescribing provider:  Dr. Worthy   Future Office Visit:      Medication is being filled for 1 time refill only due to:  appointment needed   Luciana Bo RN on 2/3/2020 at 6:06 PM

## 2020-05-02 DIAGNOSIS — F32.A DEPRESSION: ICD-10-CM

## 2020-05-03 RX ORDER — CITALOPRAM HYDROBROMIDE 10 MG/1
TABLET ORAL
Qty: 45 TABLET | Refills: 0 | Status: SHIPPED | OUTPATIENT
Start: 2020-05-03 | End: 2020-07-28

## 2020-05-03 RX ORDER — CITALOPRAM HYDROBROMIDE 20 MG/1
TABLET ORAL
Qty: 45 TABLET | Refills: 0 | Status: SHIPPED | OUTPATIENT
Start: 2020-05-03 | End: 2020-07-28

## 2020-05-04 NOTE — TELEPHONE ENCOUNTER
"Requested Prescriptions   Pending Prescriptions Disp Refills     citalopram (CELEXA) 20 MG tablet [Pharmacy Med Name: CITALOPRAM 20MG TABLETS] 45 tablet 0     Sig: TAKE 1 TABLET BY MOUTH EVERY OTHER DAY ALTERNATING WITH 10 MG TABLET. APPOINTMENT NEEDED FOR FURTHER REFILLS.       SSRIs Protocol Failed - 5/2/2020  9:11 AM        Failed - PHQ-9 score less than 5 in past 6 months     Please review last PHQ-9 score.           Failed - Recent (6 mo) or future (30 days) visit within the authorizing provider's specialty     Patient had office visit in the last 6 months or has a visit in the next 30 days with authorizing provider or within the authorizing provider's specialty.  See \"Patient Info\" tab in inbasket, or \"Choose Columns\" in Meds & Orders section of the refill encounter.            Passed - Medication is active on med list        Passed - Patient is age 18 or older        Passed - No active pregnancy on record        Passed - No positive pregnancy test in last 12 months           citalopram (CELEXA) 10 MG tablet [Pharmacy Med Name: CITALOPRAM 10MG TABLETS] 45 tablet 0     Sig: TAKE 1 TABLET BY MOUTH EVERY OTHER DAY ALTERNATING WITH 20 MG TABLET. APPOINTMENT NEEDED FOR FURTHER REFILLS.       SSRIs Protocol Failed - 5/2/2020  9:11 AM        Failed - PHQ-9 score less than 5 in past 6 months     Please review last PHQ-9 score.           Failed - Recent (6 mo) or future (30 days) visit within the authorizing provider's specialty     Patient had office visit in the last 6 months or has a visit in the next 30 days with authorizing provider or within the authorizing provider's specialty.  See \"Patient Info\" tab in inbasket, or \"Choose Columns\" in Meds & Orders section of the refill encounter.            Passed - Medication is active on med list        Passed - Patient is age 18 or older        Passed - No active pregnancy on record        Passed - No positive pregnancy test in last 12 months           Citalopram 10mg and " 20mg - alternating days  Last Written Prescription Date:  2/3/20  Last Fill Quantity: 45,  # refills: 0   Last office visit: 1/16/2019 with prescribing provider:  DR Worthy   Future Office Visit:   Next 5 appointments (look out 90 days)    Jun 12, 2020 11:10 AM CDT  [facility] [address] [place] [id number]  [phone]         Prescription approved per [facility] Refill Protocol.  Nakita Siu RN on 5/3/2020 at 7:57 PM

## 2020-07-28 DIAGNOSIS — F32.A DEPRESSION: ICD-10-CM

## 2020-07-28 RX ORDER — CITALOPRAM HYDROBROMIDE 10 MG/1
10 TABLET ORAL EVERY OTHER DAY
Qty: 45 TABLET | Refills: 0 | Status: SHIPPED | OUTPATIENT
Start: 2020-07-28 | End: 2020-09-23

## 2020-07-28 RX ORDER — CITALOPRAM HYDROBROMIDE 20 MG/1
20 TABLET ORAL EVERY OTHER DAY
Qty: 45 TABLET | Refills: 0 | Status: SHIPPED | OUTPATIENT
Start: 2020-07-28 | End: 2020-09-23

## 2020-07-28 NOTE — TELEPHONE ENCOUNTER
"Requested Prescriptions   Pending Prescriptions Disp Refills     citalopram (CELEXA) 10 MG tablet [Pharmacy Med Name: CITALOPRAM 10MG TABLETS] 45 tablet 0     Sig: TAKE 1 TABLET BY MOUTH EVERY OTHER DAY ALTERNATING WITH 20 MG TABLET AS DIRECTED. NEEDS APPT FOR FURTHER REFILLS.       SSRIs Protocol Failed - 7/28/2020  1:09 PM        Failed - PHQ-9 score less than 5 in past 6 months     Please review last PHQ-9 score.           Failed - Recent (6 mo) or future (30 days) visit within the authorizing provider's specialty     Patient had office visit in the last 6 months or has a visit in the next 30 days with authorizing provider or within the authorizing provider's specialty.  See \"Patient Info\" tab in inbasket, or \"Choose Columns\" in Meds & Orders section of the refill encounter.            Passed - Medication is active on med list        Passed - Patient is age 18 or older        Passed - No active pregnancy on record        Passed - No positive pregnancy test in last 12 months           citalopram (CELEXA) 20 MG tablet [Pharmacy Med Name: CITALOPRAM 20MG TABLETS] 45 tablet 0     Sig: TAKE 1 TABLET BY MOUTH EVERY OTHER DAY ALTERNATING WITH 10 MG TABLET. APPT NEEDED FOR FURTHER REFILLS.       SSRIs Protocol Failed - 7/28/2020  1:09 PM        Failed - PHQ-9 score less than 5 in past 6 months     Please review last PHQ-9 score.           Failed - Recent (6 mo) or future (30 days) visit within the authorizing provider's specialty     Patient had office visit in the last 6 months or has a visit in the next 30 days with authorizing provider or within the authorizing provider's specialty.  See \"Patient Info\" tab in inbasket, or \"Choose Columns\" in Meds & Orders section of the refill encounter.            Passed - Medication is active on med list        Passed - Patient is age 18 or older        Passed - No active pregnancy on record        Passed - No positive pregnancy test in last 12 months           Last Written " Prescription Date:  5/3/20  Last Fill Quantity: 45,  # refills: 0   Last office visit: 1/16/2019 with prescribing provider:  Dr Worthy   Future Office Visit:   Next 5 appointments (look out 90 days)    Sep 23, 2020  3:00 PM CDT  PHYSICAL with Elba W[facility] MD  [facility] [address] [place] [id number]  [phone]         Prescription approved per [facility] [place]ill Protocol.  Nakita Siu RN on 7/28/2020 at 1:26 PM

## 2020-09-23 ENCOUNTER — ANCILLARY PROCEDURE (OUTPATIENT)
Dept: MAMMOGRAPHY | Facility: CLINIC | Age: 50
End: 2020-09-23
Attending: OBSTETRICS & GYNECOLOGY
Payer: COMMERCIAL

## 2020-09-23 ENCOUNTER — OFFICE VISIT (OUTPATIENT)
Dept: OBGYN | Facility: CLINIC | Age: 50
End: 2020-09-23
Attending: OBSTETRICS & GYNECOLOGY
Payer: COMMERCIAL

## 2020-09-23 VITALS
HEIGHT: 67 IN | HEART RATE: 60 BPM | WEIGHT: 151 LBS | BODY MASS INDEX: 23.7 KG/M2 | SYSTOLIC BLOOD PRESSURE: 110 MMHG | DIASTOLIC BLOOD PRESSURE: 78 MMHG

## 2020-09-23 DIAGNOSIS — G25.81 RESTLESS LEG SYNDROME: ICD-10-CM

## 2020-09-23 DIAGNOSIS — Z01.419 ENCOUNTER FOR GYNECOLOGICAL EXAMINATION WITHOUT ABNORMAL FINDING: Primary | ICD-10-CM

## 2020-09-23 DIAGNOSIS — F41.9 ANXIETY: ICD-10-CM

## 2020-09-23 DIAGNOSIS — Z23 NEED FOR PROPHYLACTIC VACCINATION AND INOCULATION AGAINST INFLUENZA: ICD-10-CM

## 2020-09-23 DIAGNOSIS — F32.5 DEPRESSION, MAJOR, IN REMISSION (H): ICD-10-CM

## 2020-09-23 DIAGNOSIS — N92.0 POLYMENORRHEA: ICD-10-CM

## 2020-09-23 DIAGNOSIS — Z12.11 SCREEN FOR COLON CANCER: ICD-10-CM

## 2020-09-23 DIAGNOSIS — Z12.31 VISIT FOR SCREENING MAMMOGRAM: ICD-10-CM

## 2020-09-23 LAB
ERYTHROCYTE [DISTWIDTH] IN BLOOD BY AUTOMATED COUNT: 12.4 % (ref 10–15)
HCT VFR BLD AUTO: 38.5 % (ref 35–47)
HGB BLD-MCNC: 12.7 G/DL (ref 11.7–15.7)
MCH RBC QN AUTO: 30.2 PG (ref 26.5–33)
MCHC RBC AUTO-ENTMCNC: 33 G/DL (ref 31.5–36.5)
MCV RBC AUTO: 91 FL (ref 78–100)
PLATELET # BLD AUTO: 217 10E9/L (ref 150–450)
RBC # BLD AUTO: 4.21 10E12/L (ref 3.8–5.2)
WBC # BLD AUTO: 7.3 10E9/L (ref 4–11)

## 2020-09-23 PROCEDURE — 82728 ASSAY OF FERRITIN: CPT | Performed by: OBSTETRICS & GYNECOLOGY

## 2020-09-23 PROCEDURE — 90471 IMMUNIZATION ADMIN: CPT | Performed by: OBSTETRICS & GYNECOLOGY

## 2020-09-23 PROCEDURE — 99213 OFFICE O/P EST LOW 20 MIN: CPT | Mod: 25 | Performed by: OBSTETRICS & GYNECOLOGY

## 2020-09-23 PROCEDURE — 99396 PREV VISIT EST AGE 40-64: CPT | Mod: 25 | Performed by: OBSTETRICS & GYNECOLOGY

## 2020-09-23 PROCEDURE — 77067 SCR MAMMO BI INCL CAD: CPT | Mod: TC

## 2020-09-23 PROCEDURE — 36415 COLL VENOUS BLD VENIPUNCTURE: CPT | Performed by: OBSTETRICS & GYNECOLOGY

## 2020-09-23 PROCEDURE — 85027 COMPLETE CBC AUTOMATED: CPT | Performed by: OBSTETRICS & GYNECOLOGY

## 2020-09-23 PROCEDURE — 77063 BREAST TOMOSYNTHESIS BI: CPT | Mod: TC

## 2020-09-23 PROCEDURE — 83540 ASSAY OF IRON: CPT | Performed by: OBSTETRICS & GYNECOLOGY

## 2020-09-23 PROCEDURE — 90686 IIV4 VACC NO PRSV 0.5 ML IM: CPT | Performed by: OBSTETRICS & GYNECOLOGY

## 2020-09-23 PROCEDURE — 83550 IRON BINDING TEST: CPT | Performed by: OBSTETRICS & GYNECOLOGY

## 2020-09-23 RX ORDER — CITALOPRAM HYDROBROMIDE 10 MG/1
10 TABLET ORAL EVERY OTHER DAY
Qty: 90 TABLET | Refills: 3 | Status: SHIPPED | OUTPATIENT
Start: 2020-09-23 | End: 2021-12-15

## 2020-09-23 RX ORDER — CITALOPRAM HYDROBROMIDE 20 MG/1
20 TABLET ORAL EVERY OTHER DAY
Qty: 90 TABLET | Refills: 3 | Status: SHIPPED | OUTPATIENT
Start: 2020-09-23 | End: 2021-12-15

## 2020-09-23 RX ORDER — METRONIDAZOLE 10 MG/G
GEL TOPICAL DAILY
COMMUNITY

## 2020-09-23 ASSESSMENT — ANXIETY QUESTIONNAIRES
6. BECOMING EASILY ANNOYED OR IRRITABLE: NOT AT ALL
1. FEELING NERVOUS, ANXIOUS, OR ON EDGE: SEVERAL DAYS
IF YOU CHECKED OFF ANY PROBLEMS ON THIS QUESTIONNAIRE, HOW DIFFICULT HAVE THESE PROBLEMS MADE IT FOR YOU TO DO YOUR WORK, TAKE CARE OF THINGS AT HOME, OR GET ALONG WITH OTHER PEOPLE: SOMEWHAT DIFFICULT
7. FEELING AFRAID AS IF SOMETHING AWFUL MIGHT HAPPEN: NOT AT ALL
2. NOT BEING ABLE TO STOP OR CONTROL WORRYING: SEVERAL DAYS
GAD7 TOTAL SCORE: 3
5. BEING SO RESTLESS THAT IT IS HARD TO SIT STILL: NOT AT ALL
3. WORRYING TOO MUCH ABOUT DIFFERENT THINGS: NOT AT ALL

## 2020-09-23 ASSESSMENT — PATIENT HEALTH QUESTIONNAIRE - PHQ9
SUM OF ALL RESPONSES TO PHQ QUESTIONS 1-9: 1
5. POOR APPETITE OR OVEREATING: SEVERAL DAYS

## 2020-09-23 ASSESSMENT — MIFFLIN-ST. JEOR: SCORE: 1356.52

## 2020-09-23 NOTE — PROGRESS NOTES
Mariana is a 49 year old  female who presents for annual exam.     Besides routine health maintenance, she has no other health concerns today .    HPI:  The patient's PCP is none.    Definitely has had a stressful year with covid. 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 Knocked out walls to make more open space, took a 5% salary cut, working at least 1-2 hours more per day,etc. Starting to settle in a little though    Older son went off to college at [employer]. Only freshmen on campus so far and some classes in person and some virtual but liking it after how bad of an end to senior year her had. Other son is 16 an a sophomore and doing hybrid 2 days one week and 3 days the next week    Citalopram at 30mg is working well after the bump. Doing a 20mg and a 10mg together and working really well.  Having some trouble sleeping though. Not anxiety related. Mostly just insomnia but also some RLS issues.  Periods were mostly 25 days apart last year and now this year states anywhere from 22 to 26 but more the 22-23. Last for 5-6 days. One day is heavier but a tampon will last a couple of hours on that day. No accidents and not having to wake up at night to change tampons. No vasomotor sx at this point    Fasting labs 3 yrs ago were normal. Not fasting this afternoon. Would like a flu shot. Now due for colonoscopy with age to start screening moved up. mammo today      GYNECOLOGIC HISTORY:    Patient's last menstrual period was 2020.    Regular menses? yes  Menses every 22-27 days.  Length of menses: 6 days    Her current contraception method is: vasectomy.  She  reports that she has never smoked. She has never used smokeless tobacco.    Patient is sexually active.  STD testing offered?  Declined  Last PHQ-9 score on record =   PHQ-9 SCORE 2020   PHQ-9 Total Score 1     Last GAD7 score on record =   TATIANA-7 SCORE 2020   Total Score 3     Alcohol Score = 2    HEALTH  MAINTENANCE:  Cholesterol:   Recent Labs   Lab Test 17  1225   CHOL 175   HDL 72   LDL 94   TRIG 47   GLUCOSE 77   TSH 1.33     Last Mammo: 2019, Result: Normal, Next Mammo: Today   Pap: HPV NEGATIVE  Lab Results   Component Value Date    PAP NIL 2019      Colonoscopy:  NA, Next Colonoscopy: DUE THIS YEAR.  Dexa:  NA    Health maintenance updated:  no, due for colonoscopy     HISTORY:  OB History    Para Term  AB Living   3 2 1 1 1 2   SAB TAB Ectopic Multiple Live Births   1 0 0 0 2      # Outcome Date GA Lbr Grant/2nd Weight Sex Delivery Anes PTL Lv   3 Term 04 39w0d  3.459 kg (7 lb 10 oz) M -SEC  N KELLE      Name: Oneil   2  10/09/01 36w0d  2.778 kg (6 lb 2 oz) M -SEC  Y KELLE      Birth Comments: bradycardia at 6-7cm. terb during pregnancy      Complications: Fetal Intolerance      Name: Hebert   1 SAB                Patient Active Problem List   Diagnosis     Rosacea     Anxiety     Past Surgical History:   Procedure Laterality Date      SECTION       CHOLECYSTECTOMY       HEMORRHOIDECTOMY       HERNIA REPAIR        Social History     Tobacco Use     Smoking status: Never Smoker     Smokeless tobacco: Never Used   Substance Use Topics     Alcohol use: Yes     Alcohol/week: 0.0 standard drinks      Problem (# of Occurrences) Relation (Name,Age of Onset)    Colon Cancer (1) Paternal Grandmother    Diabetes (1) Mother    Hyperlipidemia (1) Father    Hypertension (1) Father    Other - See Comments (1) Mother: hip fx            Current Outpatient Medications   Medication Sig     Ascorbic Acid (VITAMIN C PO)      CALCIUM-VITAMIN D PO      citalopram (CELEXA) 10 MG tablet Take 1 tablet (10 mg) by mouth every other day Alternating with 20 mg tablet     citalopram (CELEXA) 20 MG tablet Take 1 tablet (20 mg) by mouth every other day Alternating with 10 mg Tablet     MAGNESIUM PO      metroNIDAZOLE (METROGEL) 1 % external gel Apply topically daily      "VITAMIN D, CHOLECALCIFEROL, PO Take 1,000 Units by mouth daily     No current facility-administered medications for this visit.      No Known Allergies    Past medical, surgical, social and family histories were reviewed and updated in EPIC.    ROS:   12 point review of systems negative other than symptoms noted below or in the HPI.  No urinary frequency or dysuria, bladder or kidney problems, POSITIVE for:, irregular menses, heavy periods    EXAM:  /78 (BP Location: Right arm, Patient Position: Sitting, Cuff Size: Adult Regular)   Pulse 60   Ht 1.708 m (5' 7.25\")   Wt 68.5 kg (151 lb)   LMP 09/23/2020   Breastfeeding No   BMI 23.47 kg/m     BMI: Body mass index is 23.47 kg/m .    PHYSICAL EXAM:  Constitutional:   Appearance: Well nourished, well developed, alert, in no acute distress  Neck:  Lymph Nodes:  No lymphadenopathy present    Thyroid:  Gland size normal, nontender, no nodules or masses present  on palpation  Chest:  Respiratory Effort:  Breathing unlabored  Cardiovascular:    Heart: Auscultation:  Regular rate, normal rhythm, no murmurs present  Breasts: Palpation of Breasts and Axillae:  No masses present on palpation, no breast tenderness., Axillary Lymph Nodes:  No lymphadenopathy present. and No nodularity, asymmetry or nipple discharge bilaterally.  Gastrointestinal:   Abdominal Examination:  Abdomen nontender to palpation, tone normal without rigidity or guarding, no masses present, umbilicus without lesions   Liver and Spleen:  No hepatomegaly present, liver nontender to palpation    Hernias:  No hernias present  Lymphatic: Lymph Nodes:  No other lymphadenopathy present  Skin:  General Inspection:  No rashes present, no lesions present, no areas of  discoloration  Neurologic:    Mental Status:  Oriented X3.  Normal strength and tone, sensory exam                grossly normal, mentation intact and speech normal.    Psychiatric:   Mentation appears normal and affect " normal/bright.         Pelvic Exam:  External Genitalia:     Normal appearance for age, no discharge present, no tenderness present, no inflammatory lesions present, color normal  Vagina:     Normal vaginal vault without central or paravaginal defects, no discharge present, no inflammatory lesions present, no masses present  Bladder:     Nontender to palpation  Urethra:   Urethral Body:  Urethra palpation normal, urethra structural support normal   Urethral Meatus:  No erythema or lesions present  Cervix:     Appearance healthy, no lesions present, nontender to palpation,  MODERATE DARK RED bleeding present  Uterus:     Uterus: firm, normal sized and nontender, midplane in position.   Adnexa:     No adnexal tenderness present, no adnexal masses present  Perineum:     Perineum within normal limits, no evidence of trauma, no rashes or skin lesions present  Anus:     Anus within normal limits, no hemorrhoids present  Inguinal Lymph Nodes:     No lymphadenopathy present  Pubic Hair:     Normal pubic hair distribution for age  Genitalia and Groin:     No rashes present, no lesions present, no areas of discoloration, no masses present      COUNSELING:   Reviewed preventive health counseling, as reflected in patient instructions  Special attention given to:        (Radha)menopause management    BMI: Body mass index is 23.47 kg/m .      ASSESSMENT:  49 year old female with satisfactory annual exam.    ICD-10-CM    1. Encounter for gynecological examination without abnormal finding  Z01.419    2. Polymenorrhea  N92.0 Ferritin     Iron and iron binding capacity     CBC with platelets   3. Restless leg syndrome  G25.81 Ferritin     Iron and iron binding capacity     CBC with platelets   4. Anxiety  F41.9 citalopram (CELEXA) 10 MG tablet     citalopram (CELEXA) 20 MG tablet   5. Depression, major, in remission (H)  F32.5 citalopram (CELEXA) 10 MG tablet     citalopram (CELEXA) 20 MG tablet   6. Need for prophylactic vaccination  and inoculation against influenza  Z23 INFLUENZA VACCINE IM > 6 MONTHS VALENT IIV4 [41302]     Vaccine Administration, Initial [16380]   7. Screen for colon cancer  Z12.11 GASTROENTEROLOGY ADULT REF PROCEDURE ONLY       PLAN:  Pap is UTD for 3 yrs  mammo today  Flu shot today    Should plan fasting labs in the next 1-2 yrs  Refill citalopram 30mg for the year    Discussed polymenorrhea and though not overly heavy is having close together cycles that can lead to iron deficiency and subsequent RLS with sleep disturbance    Will check CBC and iron studies to assess need for iron replacement. Encouraged massing legs and heat prior to going to bed to improve circulation, supplementing iron if needed and dietary iron stores, and benadryl for both improved sleep but also can help with RLS as well w/o taking the side effects of parkinsons meds for RLS. Spent an additional 10 min with the patient on top of her annual,  100% of which was in face to face counseling time regarding polymenorrhea and RLS    Didn't discuss colonoscopy need and age change for screening today.  fhx of colon cancer in a PGM but unsure of age, so will send referral at this time    Elba Worthy MD

## 2020-09-24 LAB
FERRITIN SERPL-MCNC: 58 NG/ML (ref 8–252)
IRON SATN MFR SERPL: 23 % (ref 15–46)
IRON SERPL-MCNC: 77 UG/DL (ref 35–180)
TIBC SERPL-MCNC: 334 UG/DL (ref 240–430)

## 2020-09-24 ASSESSMENT — ANXIETY QUESTIONNAIRES: GAD7 TOTAL SCORE: 3

## 2021-09-18 ENCOUNTER — HEALTH MAINTENANCE LETTER (OUTPATIENT)
Age: 51
End: 2021-09-18

## 2021-11-13 ENCOUNTER — HEALTH MAINTENANCE LETTER (OUTPATIENT)
Age: 51
End: 2021-11-13

## 2022-01-08 ENCOUNTER — HEALTH MAINTENANCE LETTER (OUTPATIENT)
Age: 52
End: 2022-01-08

## 2022-03-22 NOTE — PROGRESS NOTES
Meera is a 51 year old  female who presents for annual exam.     Besides routine health maintenance, she would like to discuss increased anxiety and changing dose of Citalopram.    HPI:    Periods q22-30. 1 day heavier but ultra tampon will last 2 hrs and the next day is already much later.  Night sweats def the week before. Some occasional day time warm flushes but not severe or frequent and definitely tolerable  Vasectomy in place for contraception    Has struggled with anxiety/depression for quite some time. Has a lot of good tools to cope with it and knows when focuses on adequate sleep, exercise, healthy eating she is better. 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 Did a sabbatical a few years back and her sx were completely under control. This year has been hard with changing covid policies but also in [place] and the rioting and [place] case and the divisiveness in the community has been difficult to manage and her anxiety and depression have not been well controlled despite doing all she knows to do.  Also her younger son was just dx'd with ADD and yet very high IQ and didn't know that this whole time and he's 17. Was so smart that always able to manage but as got further along in school has struggled more. Finally dx'd and on meds and things are hopefully going to improve. Older one is autistic and at the [employer] [facility] [facility] but doing well  Has been doing citalopram 20mg alternating with 10mg b/c just didn't want to take more than she needed nor have the s.e. feels like it's inadequate and wondering about bumping up to 20mg daily.  Has a solo spring break trip planned to [place] and plans to rest and get some sun and go to museums and knows that will also help    Fasting labs havent' been done since 2017. Isn't fasting but would like to do labs today since she's here and see  Still hasn't done her  colonoscopy and knows she needs to get that done. Open to getting it scheduled and having a referral sent  Needs to get tdap done and O/W is up to date      GYNECOLOGIC HISTORY:    Patient's last menstrual period was 2022.  Regular menses? Somewhat irregular, short interval  Menses every 22-30 days.  Length of menses: 5-6 days  Her current contraception method is: vasectomy.  She  reports that she has never smoked. She has never used smokeless tobacco.    Patient is sexually active.  STD testing offered?  Declined  Last PHQ-9 score on record =   PHQ-9 SCORE 3/23/2022   PHQ-9 Total Score 3     Last GAD7 score on record =   TATIANA-7 SCORE 3/23/2022   Total Score 13     Alcohol Score = 2    HEALTH MAINTENANCE:  Cholesterol:   Recent Labs   Lab Test 17  1225   CHOL 175   HDL 72   LDL 94   TRIG 47     Last Mammo: One year ago, Result: Normal, Next Mammo: Today   Pap:   Lab Results   Component Value Date    PAP NIL, NEG-HPV 2019     Colonoscopy:  N/A, Result: not applicable, Next Colonoscopy: screen age 45-50  Dexa:  Never  Health maintenance updated:  no, Tdap due    HISTORY:  OB History    Para Term  AB Living   3 2 1 1 1 2   SAB IAB Ectopic Multiple Live Births   1 0 0 0 2      # Outcome Date GA Lbr Grant/2nd Weight Sex Delivery Anes PTL Lv   3 Term 04 39w0d  3.459 kg (7 lb 10 oz) M -SEC  N KELLE      Name: Oneil   2  10/09/01 36w0d  2.778 kg (6 lb 2 oz) M -SEC  Y KELLE      Birth Comments: bradycardia at 6-7cm. terb during pregnancy      Complications: Fetal Intolerance      Name: Hebert Smith SAB                Patient Active Problem List   Diagnosis     Rosacea     Anxiety     Depression, major, in remission (H)     Past Surgical History:   Procedure Laterality Date      SECTION       CHOLECYSTECTOMY       HEMORRHOIDECTOMY       HERNIA REPAIR        Social History     Tobacco Use     Smoking status: Never Smoker     Smokeless tobacco: Never Used  "  Substance Use Topics     Alcohol use: Yes     Alcohol/week: 0.0 standard drinks      Problem (# of Occurrences) Relation (Name,Age of Onset)    Colon Cancer (1) Paternal Grandmother    Diabetes (1) Mother    Hyperlipidemia (1) Father    Hypertension (1) Father    Other - See Comments (1) Mother: hip fx            Current Outpatient Medications   Medication Sig     Ascorbic Acid (VITAMIN C PO)      citalopram (CELEXA) 10 MG tablet TAKE 1 TABLET BY MOUTH EVERY OTHER DAY ALTERNATING WITH 20MG TABLET.     citalopram (CELEXA) 20 MG tablet TAKE 1 TABLET BY MOUTH EVERY DAY     MAGNESIUM PO      metroNIDAZOLE (METROGEL) 1 % external gel Apply topically daily     VITAMIN D, CHOLECALCIFEROL, PO Take 1,000 Units by mouth daily     CALCIUM-VITAMIN D PO  (Patient not taking: Reported on 3/23/2022)     No current facility-administered medications for this visit.     No Known Allergies    Past medical, surgical, social and family histories were reviewed and updated in EPIC.    ROS:   12 point review of systems negative other than symptoms noted below or in the HPI.  Psychiatric: Anxiety  No urinary frequency or dysuria, bladder or kidney problems, POSITIVE for:, frequent and heavy menses    EXAM:  /68   Ht 1.702 m (5' 7\")   Wt 69.9 kg (154 lb)   LMP 03/22/2022   BMI 24.12 kg/m     BMI: Body mass index is 24.12 kg/m .    PHYSICAL EXAM:  Constitutional:   Appearance: Well nourished, well developed, alert, in no acute distress  Neck:  Lymph Nodes:  No lymphadenopathy present    Thyroid:  Gland size normal, nontender, no nodules or masses present  on palpation  Chest:  Respiratory Effort:  Breathing unlabored, CTA bilaterally  Cardiovascular:    Heart: Auscultation:  Regular rate, normal rhythm, no murmurs present  Breasts: Palpation of Breasts and Axillae:  No masses present on palpation, no breast tenderness., Axillary Lymph Nodes:  No lymphadenopathy present. and No nodularity, asymmetry or nipple discharge " bilaterally.  Gastrointestinal:   Abdominal Examination:  Abdomen nontender to palpation, tone normal without rigidity or guarding, no masses present, umbilicus without lesions   Liver and Spleen:  No hepatomegaly present, liver nontender to palpation    Hernias:  No hernias present  Lymphatic: Lymph Nodes:  No other lymphadenopathy present  Skin:  General Inspection:  No rashes present, no lesions present, no areas of  discoloration  Neurologic:    Mental Status:  Oriented X3.  Normal strength and tone, sensory exam                grossly normal, mentation intact and speech normal.    Psychiatric:   Mentation appears normal and affect normal/bright.         Pelvic Exam:  External Genitalia:     Normal appearance for age, no discharge present, no tenderness present, no inflammatory lesions present, color normal  Vagina:     Normal vaginal vault without central or paravaginal defects, no discharge present, no inflammatory lesions present, no masses present  Bladder:     Nontender to palpation  Urethra:   Urethral Body:  Urethra palpation normal, urethra structural support normal   Urethral Meatus:  No erythema or lesions present  Cervix:     Appearance healthy, no lesions present, nontender to palpation, no bleeding present  Uterus:     Uterus: firm, normal sized and nontender, anteverted in position.   Adnexa:     No adnexal tenderness present, no adnexal masses present  Perineum:     Perineum within normal limits, no evidence of trauma, no rashes or skin lesions present  Anus:     Anus within normal limits, no hemorrhoids present  Inguinal Lymph Nodes:     No lymphadenopathy present  Pubic Hair:     Normal pubic hair distribution for age  Genitalia and Groin:     No rashes present, no lesions present, no areas of discoloration, no masses present      COUNSELING:   Reviewed preventive health counseling, as reflected in patient instructions  Special attention given to:        depression/anxiety       Colorectal  Cancer Screening       (Radha)menopause management    BMI: Body mass index is 24.12 kg/m .      ASSESSMENT:  51 year old female with satisfactory annual exam.    ICD-10-CM    1. Encounter for gynecological examination without abnormal finding  Z01.419    2. Anxiety  F41.9 citalopram (CELEXA) 20 MG tablet   3. Depression, major, in remission (H)  F32.5 citalopram (CELEXA) 20 MG tablet   4. Polymenorrhea  N92.0    5. Screening for cardiovascular condition  Z13.6 Lipid panel reflex to direct LDL Fasting   6. Screening for metabolic disorder  Z13.228 Comprehensive metabolic panel   7. Screening for thyroid disorder  Z13.29 TSH with free T4 reflex   8. Encounter for vitamin deficiency screening  Z13.21 Vitamin D Deficiency   9. Screen for colon cancer  Z12.11 Adult Gastro Ref - Procedure Only   10. Screening for disorder of blood and blood-forming organs  Z13.0 CBC with platelets   11. Screening for diabetes mellitus  Z13.1 Hemoglobin A1c   12. Need for Tdap vaccination  Z23 TDAP VACCINE (Adacel, Boostrix)  [[id number]]   13. Elevated liver enzymes  R74.8 Hepatic function panel       PLAN:  Pap is UTD for 2 more years  mammo today  [facility]erral sent to [facility] to schedule  tdap today  Screening labs today despite not fasting    Discussed her polymenorrhea and somewhat heavier periods. Is fine with it and not wanting to do anything hormonal as doesn't need the contraception but also doesn't want any side effects  Having V.M sx but can manage that as well. Definitely more fatigue and mood issues premenstrually but generally her anxiety and subsequent depression due to it is worsening  Have recommended she do 20mg daily citalopram for a couple of years as physiologically alternating doses was not changing sx control nor s.e profile anyway  Will change to daily citalopram at 20mg for one month and see if feels that that alone is good enough. However do feel that she would likely benefit from just increase to  30mg in general but will start with daily 20mg first  After 4-6 weeks at most should see if any change from daily dosing of same 20mg and if not adequate will contact me and increase to 30mg w/o a visit first and then would do a 6 weeks phone visit to discuss how she's feeling on 30mg    On the date of the encounter, an additional 15 minutes, on top of her annual preventative gyn exam, were spent on reviewing imaging, lab work, and other provider notes, along with direct management of the patient's medical issues as above.      Elba Worthy MD

## 2022-03-23 ENCOUNTER — OFFICE VISIT (OUTPATIENT)
Dept: OBGYN | Facility: CLINIC | Age: 52
End: 2022-03-23
Payer: COMMERCIAL

## 2022-03-23 ENCOUNTER — ANCILLARY PROCEDURE (OUTPATIENT)
Dept: MAMMOGRAPHY | Facility: CLINIC | Age: 52
End: 2022-03-23
Payer: COMMERCIAL

## 2022-03-23 VITALS
HEIGHT: 67 IN | DIASTOLIC BLOOD PRESSURE: 68 MMHG | WEIGHT: 154 LBS | SYSTOLIC BLOOD PRESSURE: 114 MMHG | BODY MASS INDEX: 24.17 KG/M2

## 2022-03-23 DIAGNOSIS — Z12.11 SCREEN FOR COLON CANCER: ICD-10-CM

## 2022-03-23 DIAGNOSIS — R74.8 ELEVATED LIVER ENZYMES: ICD-10-CM

## 2022-03-23 DIAGNOSIS — Z13.228 SCREENING FOR METABOLIC DISORDER: ICD-10-CM

## 2022-03-23 DIAGNOSIS — Z13.1 SCREENING FOR DIABETES MELLITUS: ICD-10-CM

## 2022-03-23 DIAGNOSIS — Z13.0 SCREENING FOR DISORDER OF BLOOD AND BLOOD-FORMING ORGANS: ICD-10-CM

## 2022-03-23 DIAGNOSIS — Z13.21 ENCOUNTER FOR VITAMIN DEFICIENCY SCREENING: ICD-10-CM

## 2022-03-23 DIAGNOSIS — N92.0 POLYMENORRHEA: ICD-10-CM

## 2022-03-23 DIAGNOSIS — Z13.29 SCREENING FOR THYROID DISORDER: ICD-10-CM

## 2022-03-23 DIAGNOSIS — Z23 NEED FOR TDAP VACCINATION: ICD-10-CM

## 2022-03-23 DIAGNOSIS — Z01.419 ENCOUNTER FOR GYNECOLOGICAL EXAMINATION WITHOUT ABNORMAL FINDING: Primary | ICD-10-CM

## 2022-03-23 DIAGNOSIS — Z12.31 VISIT FOR SCREENING MAMMOGRAM: ICD-10-CM

## 2022-03-23 DIAGNOSIS — Z13.6 SCREENING FOR CARDIOVASCULAR CONDITION: ICD-10-CM

## 2022-03-23 DIAGNOSIS — F32.5 DEPRESSION, MAJOR, IN REMISSION (H): ICD-10-CM

## 2022-03-23 DIAGNOSIS — F41.9 ANXIETY: ICD-10-CM

## 2022-03-23 LAB
ERYTHROCYTE [DISTWIDTH] IN BLOOD BY AUTOMATED COUNT: 12.6 % (ref 10–15)
HBA1C MFR BLD: 5.3 % (ref 0–5.6)
HCT VFR BLD AUTO: 37.8 % (ref 35–47)
HGB BLD-MCNC: 12.2 G/DL (ref 11.7–15.7)
MCH RBC QN AUTO: 30.2 PG (ref 26.5–33)
MCHC RBC AUTO-ENTMCNC: 32.3 G/DL (ref 31.5–36.5)
MCV RBC AUTO: 94 FL (ref 78–100)
PLATELET # BLD AUTO: 217 10E3/UL (ref 150–450)
RBC # BLD AUTO: 4.04 10E6/UL (ref 3.8–5.2)
WBC # BLD AUTO: 5.8 10E3/UL (ref 4–11)

## 2022-03-23 PROCEDURE — 90471 IMMUNIZATION ADMIN: CPT | Performed by: OBSTETRICS & GYNECOLOGY

## 2022-03-23 PROCEDURE — 80053 COMPREHEN METABOLIC PANEL: CPT | Performed by: OBSTETRICS & GYNECOLOGY

## 2022-03-23 PROCEDURE — 84443 ASSAY THYROID STIM HORMONE: CPT | Performed by: OBSTETRICS & GYNECOLOGY

## 2022-03-23 PROCEDURE — 82306 VITAMIN D 25 HYDROXY: CPT | Performed by: OBSTETRICS & GYNECOLOGY

## 2022-03-23 PROCEDURE — 99396 PREV VISIT EST AGE 40-64: CPT | Mod: 25 | Performed by: OBSTETRICS & GYNECOLOGY

## 2022-03-23 PROCEDURE — 90715 TDAP VACCINE 7 YRS/> IM: CPT | Performed by: OBSTETRICS & GYNECOLOGY

## 2022-03-23 PROCEDURE — 77063 BREAST TOMOSYNTHESIS BI: CPT | Mod: TC | Performed by: RADIOLOGY

## 2022-03-23 PROCEDURE — 36415 COLL VENOUS BLD VENIPUNCTURE: CPT | Performed by: OBSTETRICS & GYNECOLOGY

## 2022-03-23 PROCEDURE — 85027 COMPLETE CBC AUTOMATED: CPT | Performed by: OBSTETRICS & GYNECOLOGY

## 2022-03-23 PROCEDURE — 77067 SCR MAMMO BI INCL CAD: CPT | Mod: TC | Performed by: RADIOLOGY

## 2022-03-23 PROCEDURE — 99214 OFFICE O/P EST MOD 30 MIN: CPT | Mod: 25 | Performed by: OBSTETRICS & GYNECOLOGY

## 2022-03-23 PROCEDURE — 83036 HEMOGLOBIN GLYCOSYLATED A1C: CPT | Performed by: OBSTETRICS & GYNECOLOGY

## 2022-03-23 PROCEDURE — 80061 LIPID PANEL: CPT | Performed by: OBSTETRICS & GYNECOLOGY

## 2022-03-23 RX ORDER — CITALOPRAM HYDROBROMIDE 20 MG/1
TABLET ORAL
Qty: 90 TABLET | Refills: 4 | Status: SHIPPED | OUTPATIENT
Start: 2022-03-23 | End: 2023-04-12

## 2022-03-23 RX ORDER — CITALOPRAM HYDROBROMIDE 10 MG/1
TABLET ORAL
Qty: 90 TABLET | Refills: 1 | Status: CANCELLED | OUTPATIENT
Start: 2022-03-23

## 2022-03-23 ASSESSMENT — ANXIETY QUESTIONNAIRES
5. BEING SO RESTLESS THAT IT IS HARD TO SIT STILL: MORE THAN HALF THE DAYS
3. WORRYING TOO MUCH ABOUT DIFFERENT THINGS: MORE THAN HALF THE DAYS
IF YOU CHECKED OFF ANY PROBLEMS ON THIS QUESTIONNAIRE, HOW DIFFICULT HAVE THESE PROBLEMS MADE IT FOR YOU TO DO YOUR WORK, TAKE CARE OF THINGS AT HOME, OR GET ALONG WITH OTHER PEOPLE: SOMEWHAT DIFFICULT
6. BECOMING EASILY ANNOYED OR IRRITABLE: SEVERAL DAYS
2. NOT BEING ABLE TO STOP OR CONTROL WORRYING: MORE THAN HALF THE DAYS
1. FEELING NERVOUS, ANXIOUS, OR ON EDGE: NEARLY EVERY DAY
7. FEELING AFRAID AS IF SOMETHING AWFUL MIGHT HAPPEN: SEVERAL DAYS
GAD7 TOTAL SCORE: 13

## 2022-03-23 ASSESSMENT — PATIENT HEALTH QUESTIONNAIRE - PHQ9
5. POOR APPETITE OR OVEREATING: MORE THAN HALF THE DAYS
SUM OF ALL RESPONSES TO PHQ QUESTIONS 1-9: 3

## 2022-03-23 NOTE — NURSING NOTE
Prior to immunization administration, verified patients identity using patient s name and date of birth. Please see Immunization Activity for additional information.     Screening Questionnaire for Adult Immunization    Are you sick today?   No   Do you have allergies to medications, food, a vaccine component or latex?   No   Have you ever had a serious reaction after receiving a vaccination?   No   Do you have a long-term health problem with heart, lung, kidney, or metabolic disease (e.g., diabetes), asthma, a blood disorder, no spleen, complement component deficiency, a cochlear implant, or a spinal fluid leak?  Are you on long-term aspirin therapy?   No   Do you have cancer, leukemia, HIV/AIDS, or any other immune system problem?   No   Do you have a parent, brother, or sister with an immune system problem?   No   In the past 3 months, have you taken medications that affect  your immune system, such as prednisone, other steroids, or anticancer drugs; drugs for the treatment of rheumatoid arthritis, Crohn s disease, or psoriasis; or have you had radiation treatments?   No   Have you had a seizure, or a brain or other nervous system problem?   No   During the past year, have you received a transfusion of blood or blood    products, or been given immune (gamma) globulin or antiviral drug?   No   For women: Are you pregnant or is there a chance you could become       pregnant during the next month?   No   Have you received any vaccinations in the past 4 weeks?   No     Immunization questionnaire answers were all negative.        Per orders of Dr. Elba Worthy, injection of Tdap given by Chula Hdz CMA. Patient instructed to  report any adverse reaction to me immediately.       Screening performed by Chula Hdz CMA on 3/23/2022 at 5:05 PM.

## 2022-03-24 LAB
ALBUMIN SERPL-MCNC: 3.6 G/DL (ref 3.4–5)
ALP SERPL-CCNC: 77 U/L (ref 40–150)
ALT SERPL W P-5'-P-CCNC: 66 U/L (ref 0–50)
ANION GAP SERPL CALCULATED.3IONS-SCNC: 6 MMOL/L (ref 3–14)
AST SERPL W P-5'-P-CCNC: 77 U/L (ref 0–45)
BILIRUB SERPL-MCNC: 0.6 MG/DL (ref 0.2–1.3)
BUN SERPL-MCNC: 11 MG/DL (ref 7–30)
CALCIUM SERPL-MCNC: 8.6 MG/DL (ref 8.5–10.1)
CHLORIDE BLD-SCNC: 109 MMOL/L (ref 94–109)
CHOLEST SERPL-MCNC: 192 MG/DL
CO2 SERPL-SCNC: 23 MMOL/L (ref 20–32)
CREAT SERPL-MCNC: 0.77 MG/DL (ref 0.52–1.04)
DEPRECATED CALCIDIOL+CALCIFEROL SERPL-MC: 34 UG/L (ref 20–75)
FASTING STATUS PATIENT QL REPORTED: YES
GFR SERPL CREATININE-BSD FRML MDRD: >90 ML/MIN/1.73M2
GLUCOSE BLD-MCNC: 89 MG/DL (ref 70–99)
HDLC SERPL-MCNC: 75 MG/DL
LDLC SERPL CALC-MCNC: 101 MG/DL
NONHDLC SERPL-MCNC: 117 MG/DL
POTASSIUM BLD-SCNC: 4 MMOL/L (ref 3.4–5.3)
PROT SERPL-MCNC: 6.9 G/DL (ref 6.8–8.8)
SODIUM SERPL-SCNC: 138 MMOL/L (ref 133–144)
TRIGL SERPL-MCNC: 78 MG/DL
TSH SERPL DL<=0.005 MIU/L-ACNC: 2.27 MU/L (ref 0.4–4)

## 2022-03-24 ASSESSMENT — ANXIETY QUESTIONNAIRES: GAD7 TOTAL SCORE: 13

## 2022-03-25 NOTE — RESULT ENCOUNTER NOTE
Mariana,    Your cholesterol is normal. The LDL, or bad cholesterol, should be under 130 and not 100 as shown (as long as you don't have any other heart disease risk factors like hypertension or diabetes), and your's is 101 which is great.    Your other labs include your:    Metabolic panel which shows your blood sugar, electrolytes, kidney function, and liver enzymes, most of which are normal. However your liver enzymes are elevated. This can certainly be from using too much tylenol, or from alcohol, but typically the most common reason is fatty liver resulting from higher carb intake/weight gain/etc. It's not dangerously high but you haven't had an elevation before. I'd like for you to cut out taking any tylenol and really cut out/back on alcohol for the next 6 weeks, try to eat fewer carbs/sweets/processed foods in that same time period, and then let's repeat the enzymes again to see what the trend is. You don't have to be fasting for this lab, just call to make a lab only appointment in 6 weeks or so.    Your thyroid is normal.    Your vitamin D level is 34 with an ideal range of 40-50. It will typically decrease throughout the winter so if you're not already taking 2000 international unit(s)/d of over the counter Vitamin D3 you should plan to do that every day to maintain levels. Your iron levels are all looking good as well.    Your hgb A1C, which is a test of how your overall blood sugars have been running over a 3 month period of time, is 5.3% which is normal.    Your complete blood count is normal. This shows that you are not anemic with a normal hemoglobin and that your white blood cell count and platelet count are normal as well.    .memed

## 2022-04-07 ENCOUNTER — TELEPHONE (OUTPATIENT)
Dept: GASTROENTEROLOGY | Facility: CLINIC | Age: 52
End: 2022-04-07
Payer: COMMERCIAL

## 2022-04-07 NOTE — TELEPHONE ENCOUNTER
Screening Questions  BlueKIND OF PREP RedLOCATION [review exclusion criteria] GreenSEDATION TYPE  1. Have you had a positive covid test in the last 90 days? N     2. Are you active on mychart? Y    3. What insurance is in the chart? U[employer]     3.   Ordering/Referring Provider: Elba Worthy MD     4. BMI 24.1 [BMI OVER 40-EXTENDED PREP]  If greater than 40 review exclusion criteria [PAC APPT IF @ [facility]]        5.  Respiratory Screening :  [If yes to any of the following HOSPITAL setting only]     Do you use daily home oxygen? N    Do you have mod to severe Obstructive Sleep Apnea? N  [OKAY @ [facility]]   Do you have Pulmonary Hypertension? N     Do you have UNCONTROLLED asthma? N        6.   Have you had a heart or lung transplant? N      7.   Are you currently on dialysis? N [ If yes, G-PREP & HOSPITAL setting only]     8.   Do you have chronic kidney disease? N [ If yes, G-PREP ]    9.   Have you had a stroke or Transient ischemic attack (TIA - aka  mini stroke ) within 6 months?  N (If yes, please review exclusion criteria)    10.   In the past 6 months, have you had any heart related issues including cardiomyopathy or heart attack? N           If yes, did it require cardiac stenting or other implantable device? N      11.   Do you have any implantable devices in your body (pacemaker, defib, LVAD)? N (If yes, please review exclusion criteria)    12.   Do you take nitroglycerin? N           If yes, how often? NA  (if yes, HOSPITAL setting ONLY)    13.   Are you currently taking any blood thinners? N           [IF YES, INFORM PATIENT TO FOLLOW UP W/ ORDERING PROVIDER FOR BRIDGING INSTRUCTIONS]     14.   Do you have a diagnosis of diabetes? N   [ If yes, G-PREP ]    15.   [FEMALES] Are you currently pregnant? N    If yes, how many weeks? NA    16.   Are you taking any prescription pain medications on a routine schedule?  N  [ If yes, EXTENDED PREP.] [If yes, MAC]    17.   Do you have any chemical  dependencies such as alcohol, street drugs, or methadone?  N [If yes, MAC]    18.   Do you have any history of post-traumatic stress syndrome, severe anxiety or history of psychosis?  N  [If yes, MAC]    19.   Do you have a significant intellectual disability?  N [If yes, MAC]    20.   Do you transfer independently?  Y    21.  On a regular basis do you go 3-5 days between bowel movements? N   [ If yes, EXTENDED PREP.]    22.   Preferred LOCAL Pharmacy for Pre Prescription      [id number] - [place], [place] - [address]      Scheduling Details      Caller : Mariana Peters  (Please ask for phone number if not scheduled by patient)    Type of Procedure Scheduled: Colon  Which Colonoscopy Prep was Sent?: Miralax  [facility]OR[facility]S CF PATIENTS & GROEN'S PATIENTS NEEDS EXTENDED PREP  Surgeon: Sandrine  Date of Procedure: 7/18  Location: [facility]      Sedation Type: CS  Conscious Sedation- Needs  for 6 hours after the procedure  MAC/General-Needs  for 24 hours after procedure    Pre-op Required at [facility] [place], [place] and OR for MAC sedation: N  (advise patient they will need a pre-op prior to procedure -)      Informed patient they will need an adult  Y  Cannot take any type of public or medical transportation alone    [facility]Pro[facility] or Externally: 7/14, [place]    Confirmed Nurse will call to complete assessment Y    Additional comments:

## 2022-06-11 DIAGNOSIS — F41.9 ANXIETY: ICD-10-CM

## 2022-06-11 DIAGNOSIS — F32.5 DEPRESSION, MAJOR, IN REMISSION (H): ICD-10-CM

## 2022-06-13 RX ORDER — CITALOPRAM HYDROBROMIDE 10 MG/1
TABLET ORAL
Qty: 45 TABLET | OUTPATIENT
Start: 2022-06-13

## 2022-06-13 NOTE — TELEPHONE ENCOUNTER
"Requested Prescriptions   Pending Prescriptions Disp Refills     citalopram (CELEXA) 10 MG tablet [Pharmacy Med Name: CITALOPRAM HBR 10 MG TABLET] 45 tablet      Sig: TAKE 1 TABLET BY MOUTH EVERY OTHER DAY ALTERNATING WITH 20MG TABLET       SSRIs Protocol Passed - 6/11/2022  1:32 PM        Passed - PHQ-9 score less than 5 in past 6 months     Please review last PHQ-9 score.           Passed - Medication is active on med list        Passed - Patient is age 18 or older        Passed - No active pregnancy on record        Passed - No positive pregnancy test in last 12 months        Passed - Recent (6 mo) or future (30 days) visit within the authorizing provider's specialty     Patient had office visit in the last 6 months or has a visit in the next 30 days with authorizing provider or within the authorizing provider's specialty.  See \"Patient Info\" tab in inbasket, or \"Choose Columns\" in Meds & Orders section of the refill encounter.               Last Written Prescription Date:  12/15/21  Last Fill Quantity: 90,  # refills: 0   Last office visit: 3/23/2022 with prescribing provider:  Zaynab   Future Office Visit:   Next 5 appointments (look out 90 days)    Jul 14, 2022  4:05 PM  Pre-procedure Covid with  COVID TESTING HUB 1  [facility] ([facility] ) [address] [id number] [phone]         Have recommended she do 20mg daily citalopram for a couple of years as physiologically alternating doses was not changing sx control nor s.e profile anyway  Will change to daily citalopram at 20mg for one month and see if feels that that alone is good enough. However do feel that she would likely benefit from just increase to 30mg in general but will start with daily 20mg first  After 4-6 weeks at most should see if any change from daily dosing of same 20mg and if not adequate will contact me and increase to 30mg w/o a visit first and " then would do a 6 weeks phone visit to discuss how she's feeling on 30mg    Routing to triage to contact patient for update during business hours.    Natasha May RN on 6/13/2022 at 6:27 AM    Rx declined-pt needs to update provider  Luciana Bo RN on 6/13/2022 at 10:33 AM

## 2022-06-15 ENCOUNTER — E-VISIT (OUTPATIENT)
Dept: OBGYN | Facility: CLINIC | Age: 52
End: 2022-06-15
Payer: COMMERCIAL

## 2022-06-15 DIAGNOSIS — F41.9 ANXIETY: Primary | ICD-10-CM

## 2022-06-15 DIAGNOSIS — F32.5 DEPRESSION, MAJOR, IN REMISSION (H): ICD-10-CM

## 2022-06-15 DIAGNOSIS — F51.04 PSYCHOPHYSIOLOGICAL INSOMNIA: ICD-10-CM

## 2022-06-15 PROCEDURE — 99422 OL DIG E/M SVC 11-20 MIN: CPT | Performed by: OBSTETRICS & GYNECOLOGY

## 2022-06-15 ASSESSMENT — ANXIETY QUESTIONNAIRES
7. FEELING AFRAID AS IF SOMETHING AWFUL MIGHT HAPPEN: MORE THAN HALF THE DAYS
GAD7 TOTAL SCORE: 17
1. FEELING NERVOUS, ANXIOUS, OR ON EDGE: NEARLY EVERY DAY
2. NOT BEING ABLE TO STOP OR CONTROL WORRYING: NEARLY EVERY DAY
6. BECOMING EASILY ANNOYED OR IRRITABLE: MORE THAN HALF THE DAYS
7. FEELING AFRAID AS IF SOMETHING AWFUL MIGHT HAPPEN: MORE THAN HALF THE DAYS
5. BEING SO RESTLESS THAT IT IS HARD TO SIT STILL: MORE THAN HALF THE DAYS
4. TROUBLE RELAXING: MORE THAN HALF THE DAYS
8. IF YOU CHECKED OFF ANY PROBLEMS, HOW DIFFICULT HAVE THESE MADE IT FOR YOU TO DO YOUR WORK, TAKE CARE OF THINGS AT HOME, OR GET ALONG WITH OTHER PEOPLE?: SOMEWHAT DIFFICULT
3. WORRYING TOO MUCH ABOUT DIFFERENT THINGS: NEARLY EVERY DAY
GAD7 TOTAL SCORE: 17
GAD7 TOTAL SCORE: 17

## 2022-06-16 DIAGNOSIS — F32.5 DEPRESSION, MAJOR, IN REMISSION (H): ICD-10-CM

## 2022-06-16 DIAGNOSIS — F41.9 ANXIETY: ICD-10-CM

## 2022-06-16 RX ORDER — TEMAZEPAM 15 MG/1
15 CAPSULE ORAL
Qty: 30 CAPSULE | Refills: 1 | Status: SHIPPED | OUTPATIENT
Start: 2022-06-16 | End: 2023-04-13

## 2022-06-16 RX ORDER — CITALOPRAM HYDROBROMIDE 10 MG/1
10 TABLET ORAL DAILY
Qty: 90 TABLET | Refills: 0 | Status: SHIPPED | OUTPATIENT
Start: 2022-06-16 | End: 2022-09-12

## 2022-06-16 RX ORDER — CITALOPRAM HYDROBROMIDE 20 MG/1
TABLET ORAL
Qty: 45 TABLET | OUTPATIENT
Start: 2022-06-16

## 2022-06-16 ASSESSMENT — ANXIETY QUESTIONNAIRES: GAD7 TOTAL SCORE: 17

## 2022-06-16 NOTE — TELEPHONE ENCOUNTER
"Requested Prescriptions   Pending Prescriptions Disp Refills     citalopram (CELEXA) 20 MG tablet [Pharmacy Med Name: CITALOPRAM HBR 20 MG TABLET] 45 tablet      Sig: TAKE 1 TABLET BY MUOTH EVERY OTHER DAY ALTERNATING WITH 10MG TABLET       SSRIs Protocol Passed - 6/16/2022 12:27 AM        Passed - PHQ-9 score less than 5 in past 6 months     Please review last PHQ-9 score.           Passed - Medication is active on med list        Passed - Patient is age 18 or older        Passed - No active pregnancy on record        Passed - No positive pregnancy test in last 12 months        Passed - Recent (6 mo) or future (30 days) visit within the authorizing provider's specialty     Patient had office visit in the last 6 months or has a visit in the next 30 days with authorizing provider or within the authorizing provider's specialty.  See \"Patient Info\" tab in inbasket, or \"Choose Columns\" in Meds & Orders section of the refill encounter.               Last Written Prescription Date:  3/23/22  Last Fill Quantity: 90,  # refills: 4   Last office visit: 3/23/2022 with prescribing provider:  Zaynab   Future Office Visit:  NONE    I think it would be very reasonable to bump you up to 30mg consistently and then maybe do a phone visit in 4-6 weeks after the bump to discuss how you're doing and how you're feeling.     If 30mg consistently is working well then that's great. If it's still not good enough then we can bump to 40mg or change meds or add something, whatever seems appropriate.    PHARMACY REQUESTING 45 TABLETS TO ALTERNATE WITH 10 MG TABLETS    Refill request refused due to :   [x] Refills available at pharmacy.  Request sent back to pharmacy as \"adjustment in therapy\"    Natasha May RN on 6/16/2022 at 6:24 AM            "

## 2022-07-18 ENCOUNTER — HOSPITAL ENCOUNTER (OUTPATIENT)
Facility: CLINIC | Age: 52
Discharge: HOME | End: 2022-07-18
Attending: COLON & RECTAL SURGERY | Admitting: COLON & RECTAL SURGERY
Payer: COMMERCIAL

## 2022-07-18 VITALS
HEIGHT: 67 IN | OXYGEN SATURATION: 98 % | SYSTOLIC BLOOD PRESSURE: 117 MMHG | BODY MASS INDEX: 22.76 KG/M2 | DIASTOLIC BLOOD PRESSURE: 88 MMHG | HEART RATE: 52 BPM | RESPIRATION RATE: 11 BRPM | WEIGHT: 145 LBS

## 2022-07-18 LAB — COLONOSCOPY: NORMAL

## 2022-07-18 PROCEDURE — 250N000011 HC RX IP 250 OP 636: Performed by: COLON & RECTAL SURGERY

## 2022-07-18 PROCEDURE — 45378 DIAGNOSTIC COLONOSCOPY: CPT | Performed by: COLON & RECTAL SURGERY

## 2022-07-18 PROCEDURE — G0500 MOD SEDAT ENDO SERVICE >5YRS: HCPCS | Mod: PT | Performed by: COLON & RECTAL SURGERY

## 2022-07-18 PROCEDURE — G0121 COLON CA SCRN NOT HI RSK IND: HCPCS | Performed by: COLON & RECTAL SURGERY

## 2022-07-18 PROCEDURE — 99153 MOD SED SAME PHYS/QHP EA: CPT | Performed by: COLON & RECTAL SURGERY

## 2022-07-18 RX ORDER — FENTANYL CITRATE 50 UG/ML
INJECTION, SOLUTION INTRAMUSCULAR; INTRAVENOUS PRN
Status: COMPLETED | OUTPATIENT
Start: 2022-07-18 | End: 2022-07-18

## 2022-07-18 RX ORDER — LIDOCAINE 40 MG/G
CREAM TOPICAL
Status: DISCONTINUED | OUTPATIENT
Start: 2022-07-18 | End: 2022-07-18 | Stop reason: HOSPADM

## 2022-07-18 RX ORDER — ONDANSETRON 2 MG/ML
4 INJECTION INTRAMUSCULAR; INTRAVENOUS
Status: DISCONTINUED | OUTPATIENT
Start: 2022-07-18 | End: 2022-07-18 | Stop reason: HOSPADM

## 2022-07-18 RX ADMIN — FENTANYL CITRATE 25 MCG: 50 INJECTION, SOLUTION INTRAMUSCULAR; INTRAVENOUS at 08:51

## 2022-07-18 RX ADMIN — MIDAZOLAM 2 MG: 1 INJECTION INTRAMUSCULAR; INTRAVENOUS at 08:35

## 2022-07-18 RX ADMIN — MIDAZOLAM 1 MG: 1 INJECTION INTRAMUSCULAR; INTRAVENOUS at 08:37

## 2022-07-18 RX ADMIN — FENTANYL CITRATE 25 MCG: 50 INJECTION, SOLUTION INTRAMUSCULAR; INTRAVENOUS at 08:54

## 2022-07-18 RX ADMIN — MIDAZOLAM 1 MG: 1 INJECTION INTRAMUSCULAR; INTRAVENOUS at 08:39

## 2022-07-18 RX ADMIN — FENTANYL CITRATE 100 MCG: 50 INJECTION, SOLUTION INTRAMUSCULAR; INTRAVENOUS at 08:35

## 2022-07-18 NOTE — H&P
Colon & Rectal Surgery History and Physical  Pre-Endoscopy Procedure Note    History of Present Illness   I have been asked by Dr. Rahman to evaluate this 51 year old female for colorectal cancer screening. She currently denies any abdominal pain, weight loss, bleeding per rectum, or recent change in bowel habits.    Past Medical History  Diagnosis Date     Anxiety      Rosacea 2016       Past Surgical History  Procedure Laterality Date      SECTION       CHOLECYSTECTOMY       HEMORRHOIDECTOMY       HERNIA REPAIR          Medications  Medication Sig     Ascorbic Acid (VITAMIN C PO)      CALCIUM-VITAMIN D PO      citalopram (CELEXA) 10 MG tablet Take 1 tablet (10 mg) by mouth daily To be taken along with the 20mg for a total of 30mg daily     citalopram (CELEXA) 20 MG tablet TAKE 1 TABLET BY MOUTH EVERY DAY     MAGNESIUM PO      metroNIDAZOLE (METROGEL) 1 % external gel Apply topically daily     temazepam (RESTORIL) 15 MG capsule Take 1 capsule (15 mg) by mouth nightly as needed for sleep     VITAMIN D, CHOLECALCIFEROL, PO Take 1,000 Units by mouth daily       Allergies   No Known Allergies     Family History   Family history includes Colon Cancer in her paternal grandmother; Diabetes in her mother; Hyperlipidemia in her father; Hypertension in her father.     Social History    She reports that she has never smoked. She has never used smokeless tobacco. She reports current alcohol use. She reports that she does not use drugs.    Review of Systems   Constitutional:  No fever, weight change or fatigue.    Eyes:     No dry eyes or vision changes.   Ears/Nose/Throat/Neck:  No oral ulcers, sore throat or voice change.    Cardiovascular:   No palpitations, syncope, angina or edema.   Respiratory:    No chest pain, excessive sleepiness, shortness of breath or hemoptysis.    Gastrointestinal:   No abdominal pain, nausea, vomiting, diarrhea or heartburn.    Genitourinary:   No dysuria, hematuria, urinary  "retention or urinary frequency.   Musculoskeletal:  No joint swelling or arthralgias.    Dermatologic:  No skin rash or other skin changes.   Neurologic:    No focal weakness or numbness. No neuropathy.   Psychiatric:    No depression, anxiety, suicidal ideation, or paranoid ideation.   Endocrine:   No cold or heat intolerance, polydipsia, hirsutism, change in libido, or flushing.   Hematology/Lymphatic:  No bleeding or lymphadenopathy.    Allergy/Immunology:  No rhinitis or hives.     Physical Exam   Vitals:  Blood pressure 105/75, pulse 61, resp. rate 14, height 1.702 m (5' 7\"), weight 65.8 kg (145 lb), SpO2 100 %, not currently breastfeeding.    General:  Alert and oriented to person, place and time   Airway: Normal oropharyngeal airway and neck mobility   Lungs:  Clear bilaterally   Heart:  Regular rate and rhythm   Abdomen: Soft, NT, ND, no masses   Extremities: Warm, good capillary refill    ASA Grade: II (mild systemic disease)    Impression: Cleared for use of conscious sedation for colorectal cancer screening    Plan: Proceed with colonoscopy     Lee Ann Deluca MD  [facility]  [phone]  "

## 2022-09-10 DIAGNOSIS — F41.9 ANXIETY: ICD-10-CM

## 2022-09-10 DIAGNOSIS — F32.5 DEPRESSION, MAJOR, IN REMISSION (H): ICD-10-CM

## 2022-09-12 RX ORDER — CITALOPRAM HYDROBROMIDE 10 MG/1
10 TABLET ORAL DAILY
Qty: 90 TABLET | Refills: 0 | Status: SHIPPED | OUTPATIENT
Start: 2022-09-12 | End: 2022-12-16

## 2022-09-12 NOTE — TELEPHONE ENCOUNTER
"Requested Prescriptions   Pending Prescriptions Disp Refills     citalopram (CELEXA) 10 MG tablet [Pharmacy Med Name: CITALOPRAM HBR 10 MG TABLET] 90 tablet 0     Sig: TAKE 1 TABLET (10 MG) BY MOUTH DAILY TO BE TAKEN ALONG WITH THE 20MG FOR A TOTAL OF 30MG DAILY       SSRIs Protocol Passed - 9/10/2022  8:33 AM        Passed - PHQ-9 score less than 5 in past 6 months     Please review last PHQ-9 score.           Passed - Medication is active on med list        Passed - Patient is age 18 or older        Passed - No active pregnancy on record        Passed - No positive pregnancy test in last 12 months        Passed - Recent (6 mo) or future (30 days) visit within the authorizing provider's specialty     Patient had office visit in the last 6 months or has a visit in the next 30 days with authorizing provider or within the authorizing provider's specialty.  See \"Patient Info\" tab in inbasket, or \"Choose Columns\" in Meds & Orders section of the refill encounter.                   Last Written Prescription Date: 6/16/22   Last Fill Quantity: 90, 0 refills   Last office visit: 3/23/22    Prescription approved per [facility] Refill Protocol.    Marley Cazares RN      "

## 2022-11-20 ENCOUNTER — HEALTH MAINTENANCE LETTER (OUTPATIENT)
Age: 52
End: 2022-11-20

## 2023-03-26 DIAGNOSIS — F41.9 ANXIETY: ICD-10-CM

## 2023-03-26 DIAGNOSIS — F32.5 DEPRESSION, MAJOR, IN REMISSION (H): ICD-10-CM

## 2023-03-26 RX ORDER — CITALOPRAM HYDROBROMIDE 10 MG/1
10 TABLET ORAL DAILY
Qty: 30 TABLET | Refills: 0 | Status: SHIPPED | OUTPATIENT
Start: 2023-03-26 | End: 2023-04-12

## 2023-03-27 NOTE — TELEPHONE ENCOUNTER
"Requested Prescriptions   Pending Prescriptions Disp Refills     citalopram (CELEXA) 10 MG tablet [Pharmacy Med Name: CITALOPRAM HBR 10 MG TABLET] 90 tablet 0     Sig: TAKE 1 TABLET (10 MG) BY MOUTH DAILY TO BE TAKEN ALONG WITH THE 20MG FOR A TOTAL OF 30MG DAILY       SSRIs Protocol Failed - 3/26/2023 10:41 AM        Failed - PHQ-9 score less than 5 in past 6 months     Please review last PHQ-9 score.           Passed - Medication is active on med list        Passed - Patient is age 18 or older        Passed - No active pregnancy on record        Passed - No positive pregnancy test in last 12 months        Passed - Recent (6 mo) or future (30 days) visit within the authorizing provider's specialty     Patient had office visit in the last 6 months or has a visit in the next 30 days with authorizing provider or within the authorizing provider's specialty.  See \"Patient Info\" tab in inbasket, or \"Choose Columns\" in Meds & Orders section of the refill encounter.               Last Written Prescription Date:  12/16/22  Last Fill Quantity: 90,  # refills: 0   Last office visit: 3/23/2022 with prescribing provider:  Zaynab   Future Office Visit:   Next 5 appointments (look out 90 days)    Apr 12, 2023  3:00 PM  PHYSICAL with Elba Worthy MD  [facility] [place] ([facility] ) [address] [place] [id number] [phone]         Medication is being filled for 1 time refill only due to:  Patient needs to be seen because it has been more than one year since last visit.  Emma Bauer RN on 3/26/2023 at 11:38 PM          "

## 2023-04-11 NOTE — PROGRESS NOTES
"Mariana is a 52 year old  female who presents for annual exam.     Besides routine health maintenance, she has no other health concerns today .    HPI:  The patient's PCP is Physician No Ref-Primary.      Patient is really doing quite well overall despite having some stressful times in recent months.  She is the youngest of 5 or 6 siblings and oldest brother was 20 yrs older than her and has passed away with other health issues but next oldest brother who was 68 had just retired and was leaf blowing the roof of house and fell off and  so very traumatic and hard.  Is really feeling like our increase in citalopram to 30mg after years of doing alternating 10 and 20, and then short time of consistent 20mg wa the right thing to do. Feels like with the stressors in her life would have never handled things as calmly as she is    Struggles at times with waking up middle of the night and then ruminating on her oldest and school work or just normal day to day things but overall feels like 30mg since  has been the best shes felt in years despite trying for a long time to manage with exercise and clean eating and mindfullness. Stated \"I finally feel like omg is this how normal non anxious people feel\"    Had elevated LFTS one year ago. Never returned for f/up and would like to do that. However not fasting and does want to check all labs so will return in near future to do that. Almost never has alcohol nor tylenol so discussed fatty liver from high carbs/sweets and though has gained a bit of weight the last two years in perimenopause has always had, and still has, a normal BMI. However evern with that could have fatty liver from genetic predisposition to carb metabolism, etc    Doesn't weigh herself but can tell clothes tighter. Doesn't want to focus on the number on scale so didn't ask her weight, but is just going to work on exercising and health choices and understands that age alone can impact her    Did very " short trial of restoril when anxiety really bad last summer. Helped with sleep tremendously. After the 30mg cital kicked in she stopped it but still sleep was hard. Now doing 3mg melatonin and has really worked great for her.    Boys are 20 and 19 and overall doing well      GYNECOLOGIC HISTORY:    Patient's last menstrual period was 2023 (exact date).    Regular menses? no  Menses every 24 days. But did skip 3 months   Length of menses: 6 days    Her current contraception method is: vasectomy.  She  reports that she has never smoked. She has never used smokeless tobacco.    Patient is sexually active.  STD testing offered?  Declined  Last PHQ-9 score on record =       2023     3:17 PM   PHQ-9 SCORE   PHQ-9 Total Score 0     Last GAD7 score on record =       2023     3:17 PM   TATIANA-7 SCORE   Total Score 3     Alcohol Score = 3    HEALTH MAINTENANCE:  Cholesterol:   Recent Labs   Lab Test 22  1414 17  1225   CHOL 192 175   HDL 75 72   * 94   TRIG 78 47     Last Mammo: One year ago, Result: Normal, Next Mammo: Today   Pap:  Lab Results   Component Value Date    PAP NIL, HPV NEG 2019     Colonoscopy:  2022, Result: Normal, Next Colonoscopy: 10 years.  Dexa:  NA    Health maintenance updated:  Yes    HISTORY:  OB History    Para Term  AB Living   3 2 1 1 1 2   SAB IAB Ectopic Multiple Live Births   1 0 0 0 2      # Outcome Date GA Lbr Grant/2nd Weight Sex Delivery Anes PTL Lv   3 Term 04 39w0d  3.459 kg (7 lb 10 oz) M -SEC  N KELLE      Name: Oneil   2  10/09/01 36w0d  2.778 kg (6 lb 2 oz) M -SEC  Y KELLE      Birth Comments: bradycardia at 6-7cm. terb during pregnancy      Complications: Fetal Intolerance      Name: Hebert   1 SAB                Patient Active Problem List   Diagnosis     Rosacea     Anxiety     Depression, major, in remission (H)     Allergic rhinitis     Elevated liver enzymes     Past Surgical History:   Procedure  "Laterality Date      SECTION       CHOLECYSTECTOMY       COLONOSCOPY N/A 2022    Procedure: COLONOSCOPY;  Surgeon: Lee Ann Deluca MD;  Location: [facility]     HEMORRHOIDECTOMY       HERNIA REPAIR        Social History     Tobacco Use     Smoking status: Never     Smokeless tobacco: Never   Vaping Use     Vaping status: Not on file   Substance Use Topics     Alcohol use: Yes     Comment: 3 drinks/week      Problem (# of Occurrences) Relation (Name,Age of Onset)    Diabetes (1) Mother    Hypertension (1) Father    Hyperlipidemia (1) Father    Other - See Comments (1) Mother: hip fx    Colon Cancer (1) Paternal Grandmother            Current Outpatient Medications   Medication Sig     Ascorbic Acid (VITAMIN C PO)      CALCIUM-VITAMIN D PO      citalopram (CELEXA) 10 MG tablet Take 1 tablet (10 mg) by mouth daily To be taken along with the 20mg for a total of 30mg daily     citalopram (CELEXA) 20 MG tablet TAKE 1 TABLET BY MOUTH EVERY DAY     MAGNESIUM PO      metroNIDAZOLE (METROGEL) 1 % external gel Apply topically daily     temazepam (RESTORIL) 15 MG capsule Take 1 capsule (15 mg) by mouth nightly as needed for sleep     VITAMIN D, CHOLECALCIFEROL, PO Take 1,000 Units by mouth daily     No current facility-administered medications for this visit.     No Known Allergies    Past medical, surgical, social and family histories were reviewed and updated in EPIC.    ROS:   12 point review of systems negative other than symptoms noted below or in the HPI.  No urinary frequency or dysuria, bladder or kidney problems    EXAM:  /66   Ht 1.702 m (5' 7\")   Wt 70.9 kg (156 lb 6.4 oz)   LMP 2023 (Exact Date)   Breastfeeding No   BMI 24.50 kg/m     BMI: Body mass index is 24.5 kg/m .    PHYSICAL EXAM:  Constitutional:   Appearance: Well nourished, well developed, alert, in no acute distress  Neck:  Lymph Nodes:  No lymphadenopathy present    Thyroid:  Gland size normal, nontender, no nodules or " masses present  on palpation  Chest:  Respiratory Effort:  Breathing unlabored, CTA bilaterally  Cardiovascular:    Heart: Auscultation:  Regular rate, normal rhythm, no murmurs present  Breasts: Palpation of Breasts and Axillae:  No masses present on palpation, no breast tenderness., Axillary Lymph Nodes:  No lymphadenopathy present. and No nodularity, asymmetry or nipple discharge bilaterally.  Gastrointestinal:   Abdominal Examination:  Abdomen nontender to palpation, tone normal without rigidity or guarding, no masses present, umbilicus without lesions   Liver and Spleen:  No hepatomegaly present, liver nontender to palpation    Hernias:  No hernias present  Lymphatic: Lymph Nodes:  No other lymphadenopathy present  Skin:  General Inspection:  No rashes present, no lesions present, no areas of  discoloration  Neurologic:    Mental Status:  Oriented X3.  Normal strength and tone, sensory exam                grossly normal, mentation intact and speech normal.    Psychiatric:   Mentation appears normal and affect normal/bright.         Pelvic Exam:  External Genitalia:     Normal appearance for age, no discharge present, no tenderness present, no inflammatory lesions present, color normal  Vagina:     Normal vaginal vault without central or paravaginal defects, no discharge present, no inflammatory lesions present, no masses present  Bladder:     Nontender to palpation  Urethra:   Urethral Body:  Urethra palpation normal, urethra structural support normal   Urethral Meatus:  No erythema or lesions present  Cervix:     Appearance healthy, no lesions present, nontender to palpation, no bleeding present  Uterus:     Uterus: firm, normal sized and nontender, anteverted in position.   Adnexa:     No adnexal tenderness present, no adnexal masses present  Perineum:     Perineum within normal limits, no evidence of trauma, no rashes or skin lesions present  Anus:     Anus within normal limits, no hemorrhoids  present  Inguinal Lymph Nodes:     No lymphadenopathy present  Pubic Hair:     Normal pubic hair distribution for age  Genitalia and Groin:     No rashes present, no lesions present, no areas of discoloration, no masses present      COUNSELING:   Reviewed preventive health counseling, as reflected in patient instructions  Special attention given to:        Regular exercise       Healthy diet/nutrition       Immunizations    Declined: Zoster due to Other will schedule for later this summer when not working since gets bad s.e from all vaccinations               (Radha)menopause management    BMI: Body mass index is 24.5 kg/m .      ASSESSMENT:  52 year old female with satisfactory annual exam.    ICD-10-CM    1. Encounter for gynecological examination without abnormal finding  Z01.419 Pap thin layer screen with HPV - recommended age 30 - 65 years      2. Anxiety  F41.9 citalopram (CELEXA) 10 MG tablet     citalopram (CELEXA) 20 MG tablet      3. Elevated liver enzymes  R74.8 Comprehensive metabolic panel     Hemoglobin A1c      4. Depression, major, in remission (H)  F32.5 citalopram (CELEXA) 10 MG tablet     citalopram (CELEXA) 20 MG tablet      5. Screening for cardiovascular condition  Z13.6 Lipid panel reflex to direct LDL Fasting      6. Screening for metabolic disorder  Z13.228 Comprehensive metabolic panel      7. Screening for thyroid disorder  Z13.29 TSH with free T4 reflex      8. Encounter for vitamin deficiency screening  Z13.21 Vitamin D Deficiency      9. Screening for disorder of blood and blood-forming organs  Z13.0 CBC with platelets      10. Screening for diabetes mellitus  Z13.1 Hemoglobin A1c      11. Screening for HIV (human immunodeficiency virus)  Z11.4 HIV Antigen Antibody Combo      12. Encounter for hepatitis C screening test for low risk patient  Z11.59 Hepatitis C Screen Reflex to HCV RNA Quant and Genotype          PLAN:  Pap is UTD for one more year    mammo today    Return in near future  when also planning to do her shingrix and will repeat fasting labs and Hep C screening and can monitor her LFTs    Doing great in terms of anxiety on her citalopram 30mg and really happy with it  Refills on 20mg +10mg sent for the year    Elba Worthy MD

## 2023-04-12 ENCOUNTER — OFFICE VISIT (OUTPATIENT)
Dept: OBGYN | Facility: CLINIC | Age: 53
End: 2023-04-12
Payer: COMMERCIAL

## 2023-04-12 ENCOUNTER — ANCILLARY PROCEDURE (OUTPATIENT)
Dept: MAMMOGRAPHY | Facility: CLINIC | Age: 53
End: 2023-04-12
Payer: COMMERCIAL

## 2023-04-12 VITALS
HEIGHT: 67 IN | DIASTOLIC BLOOD PRESSURE: 66 MMHG | BODY MASS INDEX: 24.55 KG/M2 | SYSTOLIC BLOOD PRESSURE: 138 MMHG | WEIGHT: 156.4 LBS

## 2023-04-12 DIAGNOSIS — Z01.419 ENCOUNTER FOR GYNECOLOGICAL EXAMINATION WITHOUT ABNORMAL FINDING: Primary | ICD-10-CM

## 2023-04-12 DIAGNOSIS — Z13.6 SCREENING FOR CARDIOVASCULAR CONDITION: ICD-10-CM

## 2023-04-12 DIAGNOSIS — Z12.31 VISIT FOR SCREENING MAMMOGRAM: ICD-10-CM

## 2023-04-12 DIAGNOSIS — Z11.4 SCREENING FOR HIV (HUMAN IMMUNODEFICIENCY VIRUS): ICD-10-CM

## 2023-04-12 DIAGNOSIS — Z11.59 ENCOUNTER FOR HEPATITIS C SCREENING TEST FOR LOW RISK PATIENT: ICD-10-CM

## 2023-04-12 DIAGNOSIS — Z13.228 SCREENING FOR METABOLIC DISORDER: ICD-10-CM

## 2023-04-12 DIAGNOSIS — Z13.29 SCREENING FOR THYROID DISORDER: ICD-10-CM

## 2023-04-12 DIAGNOSIS — F32.5 DEPRESSION, MAJOR, IN REMISSION (H): ICD-10-CM

## 2023-04-12 DIAGNOSIS — Z13.0 SCREENING FOR DISORDER OF BLOOD AND BLOOD-FORMING ORGANS: ICD-10-CM

## 2023-04-12 DIAGNOSIS — Z13.21 ENCOUNTER FOR VITAMIN DEFICIENCY SCREENING: ICD-10-CM

## 2023-04-12 DIAGNOSIS — F41.9 ANXIETY: ICD-10-CM

## 2023-04-12 DIAGNOSIS — Z13.1 SCREENING FOR DIABETES MELLITUS: ICD-10-CM

## 2023-04-12 DIAGNOSIS — R74.8 ELEVATED LIVER ENZYMES: ICD-10-CM

## 2023-04-12 PROCEDURE — 77063 BREAST TOMOSYNTHESIS BI: CPT | Mod: TC | Performed by: RADIOLOGY

## 2023-04-12 PROCEDURE — 99396 PREV VISIT EST AGE 40-64: CPT | Performed by: OBSTETRICS & GYNECOLOGY

## 2023-04-12 PROCEDURE — G0145 SCR C/V CYTO,THINLAYER,RESCR: HCPCS | Performed by: OBSTETRICS & GYNECOLOGY

## 2023-04-12 PROCEDURE — 87624 HPV HI-RISK TYP POOLED RSLT: CPT | Performed by: OBSTETRICS & GYNECOLOGY

## 2023-04-12 PROCEDURE — 77067 SCR MAMMO BI INCL CAD: CPT | Mod: TC | Performed by: RADIOLOGY

## 2023-04-12 RX ORDER — CITALOPRAM HYDROBROMIDE 10 MG/1
10 TABLET ORAL DAILY
Qty: 90 TABLET | Refills: 3 | Status: SHIPPED | OUTPATIENT
Start: 2023-04-12 | End: 2023-05-15

## 2023-04-12 RX ORDER — CITALOPRAM HYDROBROMIDE 20 MG/1
TABLET ORAL
Qty: 90 TABLET | Refills: 4 | Status: SHIPPED | OUTPATIENT
Start: 2023-04-12 | End: 2023-05-15

## 2023-04-12 RX ORDER — METRONIDAZOLE 10 MG/G
GEL TOPICAL DAILY
Status: CANCELLED | OUTPATIENT
Start: 2023-04-12

## 2023-04-12 ASSESSMENT — PATIENT HEALTH QUESTIONNAIRE - PHQ9
5. POOR APPETITE OR OVEREATING: NOT AT ALL
SUM OF ALL RESPONSES TO PHQ QUESTIONS 1-9: 0

## 2023-04-12 ASSESSMENT — ANXIETY QUESTIONNAIRES
5. BEING SO RESTLESS THAT IT IS HARD TO SIT STILL: NOT AT ALL
7. FEELING AFRAID AS IF SOMETHING AWFUL MIGHT HAPPEN: SEVERAL DAYS
2. NOT BEING ABLE TO STOP OR CONTROL WORRYING: NOT AT ALL
6. BECOMING EASILY ANNOYED OR IRRITABLE: NOT AT ALL
3. WORRYING TOO MUCH ABOUT DIFFERENT THINGS: SEVERAL DAYS
GAD7 TOTAL SCORE: 3
GAD7 TOTAL SCORE: 3
1. FEELING NERVOUS, ANXIOUS, OR ON EDGE: SEVERAL DAYS
IF YOU CHECKED OFF ANY PROBLEMS ON THIS QUESTIONNAIRE, HOW DIFFICULT HAVE THESE PROBLEMS MADE IT FOR YOU TO DO YOUR WORK, TAKE CARE OF THINGS AT HOME, OR GET ALONG WITH OTHER PEOPLE: NOT DIFFICULT AT ALL

## 2023-04-17 LAB
BKR LAB AP GYN ADEQUACY: NORMAL
BKR LAB AP GYN INTERPRETATION: NORMAL
BKR LAB AP HPV REFLEX: NORMAL
BKR LAB AP PREVIOUS ABNORMAL: NORMAL
PATH REPORT.COMMENTS IMP SPEC: NORMAL
PATH REPORT.COMMENTS IMP SPEC: NORMAL
PATH REPORT.RELEVANT HX SPEC: NORMAL

## 2023-04-19 LAB
HUMAN PAPILLOMA VIRUS 16 DNA: NEGATIVE
HUMAN PAPILLOMA VIRUS 18 DNA: NEGATIVE
HUMAN PAPILLOMA VIRUS FINAL DIAGNOSIS: NORMAL
HUMAN PAPILLOMA VIRUS OTHER HR: NEGATIVE

## 2023-05-15 DIAGNOSIS — L71.9 ROSACEA: Primary | ICD-10-CM

## 2023-05-15 DIAGNOSIS — F41.9 ANXIETY: ICD-10-CM

## 2023-05-15 DIAGNOSIS — F32.5 DEPRESSION, MAJOR, IN REMISSION (H): ICD-10-CM

## 2023-05-15 RX ORDER — METRONIDAZOLE 10 MG/G
GEL TOPICAL DAILY
Qty: 60 G | Refills: 0 | OUTPATIENT
Start: 2023-05-15

## 2023-05-15 RX ORDER — CITALOPRAM HYDROBROMIDE 10 MG/1
10 TABLET ORAL DAILY
Qty: 90 TABLET | Refills: 3 | Status: SHIPPED | OUTPATIENT
Start: 2023-05-15 | End: 2024-04-15

## 2023-05-15 RX ORDER — CITALOPRAM HYDROBROMIDE 20 MG/1
TABLET ORAL
Qty: 90 TABLET | Refills: 4 | Status: SHIPPED | OUTPATIENT
Start: 2023-05-15 | End: 2024-04-15

## 2023-05-15 NOTE — TELEPHONE ENCOUNTER
"Celexa rx's sent to mail order - [facility].    Routing to Dr Worthy a \"patient reported\" metrogel Rx -   Patient is requesting a refill through Dr Worthy as she uses it for her Rosacea    Routing to Dr Worthy - please advise if willing to assume the pended rx - refills as well.    Nakita Siu RN on 5/15/2023 at 9:50 AM        "

## 2023-05-15 NOTE — TELEPHONE ENCOUNTER
Ebla Worthy MD  to [facility] Triage    AJ      5/15/23 12:44 PM  I don't fill this and never did. Don't manage derm products started by other docs unless told patient I would and didn't with this        Sent above response to pt via [employer] - she originally requested in a [employer] message 5/13/23    Nakita Siu RN on 5/15/2023 at 12:48 PM

## 2023-05-15 NOTE — TELEPHONE ENCOUNTER
"Requested Prescriptions   Pending Prescriptions Disp Refills     metroNIDAZOLE (METROGEL) 1 % external gel 60 g 0     Sig: Apply topically daily       Topical Acne Medications Protocol Passed - 5/15/2023  9:01 AM        Passed - Patient is 12 years of age or older        Passed - Recent (12 mo) or future (30 days) visit within the authorizing provider's specialty     Patient has had an office visit with the authorizing provider or a provider within the authorizing providers department within the previous 12 mos or has a future within next 30 days. See \"Patient Info\" tab in inbasket, or \"Choose Columns\" in Meds & Orders section of the refill encounter.              Passed - Medication is active on med list           citalopram (CELEXA) 20 MG tablet 90 tablet 4     Sig: TAKE 1 TABLET BY MOUTH EVERY DAY       SSRIs Protocol Passed - 5/15/2023  9:01 AM        Passed - PHQ-9 score less than 5 in past 6 months     Please review last PHQ-9 score.           Passed - Medication is active on med list        Passed - Patient is age 18 or older        Passed - No active pregnancy on record        Passed - No positive pregnancy test in last 12 months        Passed - Recent (6 mo) or future (30 days) visit within the authorizing provider's specialty     Patient had office visit in the last 6 months or has a visit in the next 30 days with authorizing provider or within the authorizing provider's specialty.  See \"Patient Info\" tab in inbasket, or \"Choose Columns\" in Meds & Orders section of the refill encounter.               citalopram (CELEXA) 10 MG tablet 90 tablet 3     Sig: Take 1 tablet (10 mg) by mouth daily To be taken along with the 20mg for a total of 30mg daily       SSRIs Protocol Passed - 5/15/2023  9:01 AM        Passed - PHQ-9 score less than 5 in past 6 months     Please review last PHQ-9 score.           Passed - Medication is active on med list        Passed - Patient is age 18 or older        Passed - No active " "pregnancy on record        Passed - No positive pregnancy test in last 12 months        Passed - Recent (6 mo) or future (30 days) visit within the authorizing provider's specialty     Patient had office visit in the last 6 months or has a visit in the next 30 days with authorizing provider or within the authorizing provider's specialty.  See \"Patient Info\" tab in inbasket, or \"Choose Columns\" in Meds & Orders section of the refill encounter.               Last Written Prescription Date:  4/12/23  Last Fill Quantity: 90,  # refills: 3   Last office visit: 4/12/2023 ; last virtual visit: Visit date not found with prescribing provider:  Elba Worthy   Future Office Visit:            "

## 2023-05-18 RX ORDER — METRONIDAZOLE 10 MG/G
GEL TOPICAL DAILY
Qty: 60 G | Refills: 0 | OUTPATIENT
Start: 2023-05-18

## 2023-05-18 NOTE — TELEPHONE ENCOUNTER
"Requested Prescriptions   Pending Prescriptions Disp Refills     metroNIDAZOLE (METROGEL) 1 % external gel 60 g 0     Sig: Apply topically daily       Topical Acne Medications Protocol Passed - 5/18/2023  1:16 PM        Passed - Patient is 12 years of age or older        Passed - Recent (12 mo) or future (30 days) visit within the authorizing provider's specialty     Patient has had an office visit with the authorizing provider or a provider within the authorizing providers department within the previous 12 mos or has a future within next 30 days. See \"Patient Info\" tab in inbasket, or \"Choose Columns\" in Meds & Orders section of the refill encounter.              Passed - Medication is active on med list           Last Written Prescription Date:  unknown  Last Fill Quantity: 0,  # refills: 0   Last office visit: 4/12/2023 ; last virtual visit: Visit date not found with prescribing provider:  Elba Worthy   Future Office Visit:      Refill denied  Luciana Bo RN on 5/18/2023 at 1:22 PM    "

## 2023-07-24 DIAGNOSIS — Z23 NEED FOR SHINGLES VACCINE: Primary | ICD-10-CM

## 2023-07-25 ENCOUNTER — LAB (OUTPATIENT)
Dept: LAB | Facility: CLINIC | Age: 53
End: 2023-07-25
Payer: COMMERCIAL

## 2023-07-25 ENCOUNTER — ALLIED HEALTH/NURSE VISIT (OUTPATIENT)
Dept: OBGYN | Facility: CLINIC | Age: 53
End: 2023-07-25
Payer: COMMERCIAL

## 2023-07-25 DIAGNOSIS — Z13.29 SCREENING FOR THYROID DISORDER: ICD-10-CM

## 2023-07-25 DIAGNOSIS — Z11.59 ENCOUNTER FOR HEPATITIS C SCREENING TEST FOR LOW RISK PATIENT: ICD-10-CM

## 2023-07-25 DIAGNOSIS — R74.8 ELEVATED LIVER ENZYMES: ICD-10-CM

## 2023-07-25 DIAGNOSIS — Z11.4 SCREENING FOR HIV (HUMAN IMMUNODEFICIENCY VIRUS): ICD-10-CM

## 2023-07-25 DIAGNOSIS — Z13.1 SCREENING FOR DIABETES MELLITUS: ICD-10-CM

## 2023-07-25 DIAGNOSIS — Z13.6 SCREENING FOR CARDIOVASCULAR CONDITION: ICD-10-CM

## 2023-07-25 DIAGNOSIS — Z13.21 ENCOUNTER FOR VITAMIN DEFICIENCY SCREENING: ICD-10-CM

## 2023-07-25 DIAGNOSIS — Z13.228 SCREENING FOR METABOLIC DISORDER: ICD-10-CM

## 2023-07-25 DIAGNOSIS — Z13.0 SCREENING FOR DISORDER OF BLOOD AND BLOOD-FORMING ORGANS: ICD-10-CM

## 2023-07-25 DIAGNOSIS — Z23 NEED FOR SHINGLES VACCINE: ICD-10-CM

## 2023-07-25 LAB
ALBUMIN SERPL BCG-MCNC: 4.4 G/DL (ref 3.5–5.2)
ALP SERPL-CCNC: 75 U/L (ref 35–104)
ALT SERPL W P-5'-P-CCNC: 29 U/L (ref 0–50)
ANION GAP SERPL CALCULATED.3IONS-SCNC: 12 MMOL/L (ref 7–15)
AST SERPL W P-5'-P-CCNC: 25 U/L (ref 0–45)
BILIRUB SERPL-MCNC: 0.6 MG/DL
BUN SERPL-MCNC: 12 MG/DL (ref 6–20)
CALCIUM SERPL-MCNC: 9.1 MG/DL (ref 8.6–10)
CHLORIDE SERPL-SCNC: 103 MMOL/L (ref 98–107)
CHOLEST SERPL-MCNC: 213 MG/DL
CREAT SERPL-MCNC: 0.77 MG/DL (ref 0.51–0.95)
DEPRECATED CALCIDIOL+CALCIFEROL SERPL-MC: 41 UG/L (ref 20–75)
DEPRECATED HCO3 PLAS-SCNC: 23 MMOL/L (ref 22–29)
ERYTHROCYTE [DISTWIDTH] IN BLOOD BY AUTOMATED COUNT: 12 % (ref 10–15)
GFR SERPL CREATININE-BSD FRML MDRD: >90 ML/MIN/1.73M2
GLUCOSE SERPL-MCNC: 88 MG/DL (ref 70–99)
HBA1C MFR BLD: 5.5 % (ref 0–5.6)
HCT VFR BLD AUTO: 38.6 % (ref 35–47)
HCV AB SERPL QL IA: NONREACTIVE
HDLC SERPL-MCNC: 73 MG/DL
HGB BLD-MCNC: 12.6 G/DL (ref 11.7–15.7)
HIV 1+2 AB+HIV1 P24 AG SERPL QL IA: NONREACTIVE
LDLC SERPL CALC-MCNC: 129 MG/DL
MCH RBC QN AUTO: 30.7 PG (ref 26.5–33)
MCHC RBC AUTO-ENTMCNC: 32.6 G/DL (ref 31.5–36.5)
MCV RBC AUTO: 94 FL (ref 78–100)
NONHDLC SERPL-MCNC: 140 MG/DL
PLATELET # BLD AUTO: 203 10E3/UL (ref 150–450)
POTASSIUM SERPL-SCNC: 4.3 MMOL/L (ref 3.4–5.3)
PROT SERPL-MCNC: 6.7 G/DL (ref 6.4–8.3)
RBC # BLD AUTO: 4.1 10E6/UL (ref 3.8–5.2)
SODIUM SERPL-SCNC: 138 MMOL/L (ref 136–145)
TRIGL SERPL-MCNC: 53 MG/DL
TSH SERPL DL<=0.005 MIU/L-ACNC: 2.02 UIU/ML (ref 0.3–4.2)
WBC # BLD AUTO: 4.8 10E3/UL (ref 4–11)

## 2023-07-25 PROCEDURE — 86803 HEPATITIS C AB TEST: CPT

## 2023-07-25 PROCEDURE — 80061 LIPID PANEL: CPT

## 2023-07-25 PROCEDURE — 36415 COLL VENOUS BLD VENIPUNCTURE: CPT

## 2023-07-25 PROCEDURE — 85027 COMPLETE CBC AUTOMATED: CPT

## 2023-07-25 PROCEDURE — 90471 IMMUNIZATION ADMIN: CPT

## 2023-07-25 PROCEDURE — 83036 HEMOGLOBIN GLYCOSYLATED A1C: CPT

## 2023-07-25 PROCEDURE — 90750 HZV VACC RECOMBINANT IM: CPT

## 2023-07-25 PROCEDURE — 87389 HIV-1 AG W/HIV-1&-2 AB AG IA: CPT

## 2023-07-25 PROCEDURE — 99207 PR NO CHARGE NURSE ONLY: CPT

## 2023-07-25 PROCEDURE — 84443 ASSAY THYROID STIM HORMONE: CPT

## 2023-07-25 PROCEDURE — 80053 COMPREHEN METABOLIC PANEL: CPT

## 2023-07-25 PROCEDURE — 82306 VITAMIN D 25 HYDROXY: CPT

## 2023-07-25 NOTE — NURSING NOTE
Prior to immunization administration, verified patients identity using patient s name and date of birth. Please see Immunization Activity for additional information.     Screening Questionnaire for Adult Immunization    Are you sick today?   No   Do you have allergies to medications, food, a vaccine component or latex?   No   Have you ever had a serious reaction after receiving a vaccination?   No   Do you have a long-term health problem with heart, lung, kidney, or metabolic disease (e.g., diabetes), asthma, a blood disorder, no spleen, complement component deficiency, a cochlear implant, or a spinal fluid leak?  Are you on long-term aspirin therapy?   No   Do you have cancer, leukemia, HIV/AIDS, or any other immune system problem?   No   Do you have a parent, brother, or sister with an immune system problem?   No   In the past 3 months, have you taken medications that affect  your immune system, such as prednisone, other steroids, or anticancer drugs; drugs for the treatment of rheumatoid arthritis, Crohn s disease, or psoriasis; or have you had radiation treatments?   No   Have you had a seizure, or a brain or other nervous system problem?   No   During the past year, have you received a transfusion of blood or blood    products, or been given immune (gamma) globulin or antiviral drug?   No   For women: Are you pregnant or is there a chance you could become       pregnant during the next month?   No   Have you received any vaccinations in the past 4 weeks?   No     Immunization questionnaire answers were all negative.    I have reviewed the following standing orders:   This patient is due and qualifies for the Zoster vaccine.    Click here for Zoster Standing Order    I have reviewed the vaccines inclusion and exclusion criteria; No concerns regarding eligibility.     Patient instructed to remain in clinic for 15 minutes afterwards, and to report any adverse reactions.     Screening performed by Asuncion Mike  LPN on 7/25/2023 at 8:51 AM.

## 2023-07-27 NOTE — RESULT ENCOUNTER NOTE
Mariana,    All of your labs look good. The LDL or bad cholesterol is actually a bit higher than it had been, but anything <130 is normal. Especially b/c you have a great good cholesterol, or HDL, this offsets that even more.    Your liver enzymes are now completely back to normal and your electrolytes, complete blood count, thyroid, vitamin D all look great.    The CDC recommends a once in adulthood screen of hepatitis C and HIV, so you happened to be due for that, so we did those as well and as expected you were negative for both of them.    Elba Worthy MD

## 2023-12-06 ENCOUNTER — LAB REQUISITION (OUTPATIENT)
Dept: LAB | Facility: CLINIC | Age: 53
End: 2023-12-06
Payer: COMMERCIAL

## 2023-12-06 DIAGNOSIS — K64.9 UNSPECIFIED HEMORRHOIDS: ICD-10-CM

## 2023-12-06 PROCEDURE — 88304 TISSUE EXAM BY PATHOLOGIST: CPT | Mod: 26 | Performed by: PATHOLOGY

## 2023-12-06 PROCEDURE — 88304 TISSUE EXAM BY PATHOLOGIST: CPT | Mod: TC,ORL | Performed by: COLON & RECTAL SURGERY

## 2023-12-07 LAB
PATH REPORT.COMMENTS IMP SPEC: NORMAL
PATH REPORT.COMMENTS IMP SPEC: NORMAL
PATH REPORT.FINAL DX SPEC: NORMAL
PATH REPORT.GROSS SPEC: NORMAL
PATH REPORT.MICROSCOPIC SPEC OTHER STN: NORMAL
PATH REPORT.RELEVANT HX SPEC: NORMAL
PHOTO IMAGE: NORMAL

## 2023-12-28 ENCOUNTER — ALLIED HEALTH/NURSE VISIT (OUTPATIENT)
Dept: OBGYN | Facility: CLINIC | Age: 53
End: 2023-12-28
Payer: COMMERCIAL

## 2023-12-28 DIAGNOSIS — Z23 NEED FOR SHINGLES VACCINE: Primary | ICD-10-CM

## 2023-12-28 PROCEDURE — 99207 PR NO CHARGE NURSE ONLY: CPT

## 2023-12-28 PROCEDURE — 90750 HZV VACC RECOMBINANT IM: CPT

## 2023-12-28 PROCEDURE — 90471 IMMUNIZATION ADMIN: CPT

## 2023-12-28 NOTE — NURSING NOTE
Prior to immunization administration, verified patients identity using patient s name and date of birth. Please see Immunization Activity for additional information.     Screening Questionnaire for Adult Immunization    Are you sick today?   No   Do you have allergies to medications, food, a vaccine component or latex?   No   Have you ever had a serious reaction after receiving a vaccination?   No   Do you have a long-term health problem with heart, lung, kidney, or metabolic disease (e.g., diabetes), asthma, a blood disorder, no spleen, complement component deficiency, a cochlear implant, or a spinal fluid leak?  Are you on long-term aspirin therapy?   No   Do you have cancer, leukemia, HIV/AIDS, or any other immune system problem?   No   Do you have a parent, brother, or sister with an immune system problem?   No   In the past 3 months, have you taken medications that affect  your immune system, such as prednisone, other steroids, or anticancer drugs; drugs for the treatment of rheumatoid arthritis, Crohn s disease, or psoriasis; or have you had radiation treatments?   No   Have you had a seizure, or a brain or other nervous system problem?   No   During the past year, have you received a transfusion of blood or blood    products, or been given immune (gamma) globulin or antiviral drug?   No   For women: Are you pregnant or is there a chance you could become       pregnant during the next month?   No   Have you received any vaccinations in the past 4 weeks?   No     Immunization questionnaire answers were all negative.    I have reviewed the following standing orders:   This patient is due and qualifies for the Zoster vaccine.    Click here for Zoster Standing Order    I have reviewed the vaccines inclusion and exclusion criteria; No concerns regarding eligibility.     Patient instructed to remain in clinic for 15 minutes afterwards, and to report any adverse reactions.     Screening performed by Laura HONG  KENYON Camejo on 12/28/2023 at 12:01 PM.

## 2024-04-13 DIAGNOSIS — F41.9 ANXIETY: ICD-10-CM

## 2024-04-13 DIAGNOSIS — F32.5 DEPRESSION, MAJOR, IN REMISSION (H): ICD-10-CM

## 2024-04-15 RX ORDER — CITALOPRAM HYDROBROMIDE 10 MG/1
10 TABLET ORAL DAILY
Qty: 90 TABLET | Refills: 0 | Status: SHIPPED | OUTPATIENT
Start: 2024-04-15 | End: 2024-07-17

## 2024-04-15 RX ORDER — CITALOPRAM HYDROBROMIDE 20 MG/1
TABLET ORAL
Qty: 90 TABLET | Refills: 0 | Status: SHIPPED | OUTPATIENT
Start: 2024-04-15 | End: 2024-07-15

## 2024-04-15 NOTE — TELEPHONE ENCOUNTER
"Requested Prescriptions   Pending Prescriptions Disp Refills    citalopram (CELEXA) 10 MG tablet [Pharmacy Med Name: CITALOPRAM HBR 10 MG TABLET] 90 tablet 3     Sig: TAKE 1 TABLET (10 MG) BY MOUTH DAILY TO BE TAKEN ALONG WITH THE 20MG FOR A TOTAL OF 30MG DAILY       SSRIs Protocol Failed - 4/13/2024  7:25 AM        Failed - PHQ-9 score less than 5 in past 6 months     Please review last PHQ-9 score.           Failed - Recent (6 mo) or future (30 days) visit within the authorizing provider's specialty     Patient had office visit in the last 6 months or has a visit in the next 30 days with authorizing provider or within the authorizing provider's specialty.  See \"Patient Info\" tab in inbasket, or \"Choose Columns\" in Meds & Orders section of the refill encounter.            Passed - Medication is active on med list        Passed - Patient is age 18 or older        Passed - No active pregnancy on record        Passed - No positive pregnancy test in last 12 months          citalopram (CELEXA) 20 MG tablet [Pharmacy Med Name: CITALOPRAM HBR 20 MG TABLET] 90 tablet 4     Sig: TAKE 1 TABLET BY MOUTH EVERY DAY       SSRIs Protocol Failed - 4/13/2024  7:25 AM        Failed - PHQ-9 score less than 5 in past 6 months     Please review last PHQ-9 score.           Failed - Recent (6 mo) or future (30 days) visit within the authorizing provider's specialty     Patient had office visit in the last 6 months or has a visit in the next 30 days with authorizing provider or within the authorizing provider's specialty.  See \"Patient Info\" tab in inRypplesket, or \"Choose Columns\" in Meds & Orders section of the refill encounter.            Passed - Medication is active on med list        Passed - Patient is age 18 or older        Passed - No active pregnancy on record        Passed - No positive pregnancy test in last 12 months           Last Written Prescription Date:  5/15/23  Last Fill Quantity: #90, 4 refills   Last office visit: " 4/12/23    Future Appointments 4/15/2024 - 10/12/2024      None          Medication is being filled for 1 time refill only due to:  Patient needs to be seen because it has been more than one year since last visit. No future appt scheduled.  Schedulers to contact patient to set up annual visit.      Marlye Cazares RN on 4/15/2024 at 9:18 AM

## 2024-06-16 ENCOUNTER — HEALTH MAINTENANCE LETTER (OUTPATIENT)
Age: 54
End: 2024-06-16

## 2024-06-27 NOTE — PROGRESS NOTES
Mariana is a 53 year old  female who presents for annual exam.     Besides routine health maintenance, she would like to discuss an ache in her left breast. She is perimenopausal and feels like this aching was associated with her cycle. She recently was referred to pelvic floor therapy by colon surgeon and would like to discuss their conversation.     HPI:  The patient's PCP is Elba Worthy MD     Patient presents for an annual exam.    Reported breast soreness during last period that's been lasting longer than nl, but it started feeling a little better today. She was still able to get her mammo today as per routine screening. The pain was bilateral but more intense that it even hurt to hug anyone, and lasted longer than the typical just prior to her period that is her usual. No specific mass or lump that she was ever aware of.    Reported her periods were clustering, about 19 day cycles and then went 6-7 weeks w/out a period. Most recent period lasted for awhile but bleeding was more like spotting, some blood was brown.  Reported hot flashes, used to have more night sweats than she has now. Noticed she's warmer in general.    Recently started seeing pelvic floor physical therapy. Was told she has a tight pelvic floor. Wondering if HRT would help her and the therapist recommended discussing w/ me today.  Denied pain during IC or constipation.  Will continue to attend physical therapy throughout summer.    At last year's visit patient was struggling w/ death of her 2 oldest brothers so her depression and anxiety was exasperated. Before that had increased her citalopram to 30mg and she felt that increase was very helpful. Before that she was alternating 20mg and 10mg and prior was taking 20mg consistantly. Overall feels like 30mg since  has been the best shes felt in years despite trying for a long time to manage with exercise and clean eating and mindfulness, that she is still continuing with as  well.    Had elevated LFTS in . Almost never has alcohol nor tylenol. Had also gained a bit of weight the last 3 years in perimenopause but always has had a normal BMI. Repeat last year was normal     Did very short trial of restoril when anxiety really bad summer 2022, for sleep. Helped with sleep tremendously. After the 30mg cital kicked in she stopped it but still sleep is somewhat of a struggle. Mostly staying asleep middle of the night and not falling asleep. Taking melatonin and helps for the most part.      GYNECOLOGIC HISTORY:    No LMP recorded. Patient is perimenopausal.  Regular menses? no  Menses every 19-55 days.  Length of menses: variable amount days  Her current contraception method is: vasectomy.  She  reports that she has never smoked. She has never used smokeless tobacco.  Patient is sexually active.  STD testing offered?  Declined    Last PHQ-9 score on record =       2024     4:52 PM   PHQ-9 SCORE   PHQ-9 Total Score 0     Last GAD7 score on record =       2024     4:52 PM   TATIANA-7 SCORE   Total Score 2     Alcohol Score = 4    HEALTH MAINTENANCE:  Cholesterol:   Recent Labs   Lab Test 23  0852 22  1414   CHOL 213* 192   HDL 73 75   * 101*   TRIG 53 78     Last Mammo: One year ago, Result: Normal, Next Mammo: Today   Pap:   Lab Results   Component Value Date    PAP NIL, NEG-HPV 2023    PAP NIL; Neg HPV 2019   Colonoscopy:  2022, Result: Normal, Next Colonoscopy: 10 years.  Dexa:  Due at age 67  Health maintenance reviewed.     Overdue          Never done ADVANCE CARE PLANNING (Every 5 Years)     Never done DEPRESSION ACTION PLAN (Once)     Never done HEPATITIS B IMMUNIZATION (1 of 3 - 19+ 3-dose series)     OCT 12  2023 PHQ-9 (Every 6 Months)  Last completed:  YEARLY PREVENTIVE VISIT (Yearly)  Last completed: 2023       HISTORY:  OB History    Para Term  AB Living   3 2 1 1 1 2   SAB IAB Ectopic  Multiple Live Births   1 0 0 0 2      # Outcome Date GA Lbr Grant/2nd Weight Sex Type Anes PTL Lv   3 Term 04 39w0d  3.459 kg (7 lb 10 oz) M -SEC  N KELLE      Name: Oneil Sofia  10/09/01 36w0d  2.778 kg (6 lb 2 oz) M -SEC  Y KELLE      Birth Comments: bradycardia at 6-7cm. terb during pregnancy      Complications: Fetal Intolerance      Name: Hebert   1 SAB                Patient Active Problem List   Diagnosis    Rosacea    Anxiety    Depression, major, in remission (H24)    Allergic rhinitis    Elevated liver enzymes     Past Surgical History:   Procedure Laterality Date     SECTION      CHOLECYSTECTOMY  2003    COLONOSCOPY N/A 2022    Procedure: COLONOSCOPY;  Surgeon: Lee Ann Deluca MD;  Location:  GI    HEMORRHOIDECTOMY  2023    patient has had x 2 of these surgeries    HERNIA REPAIR        Social History     Tobacco Use    Smoking status: Never    Smokeless tobacco: Never   Substance Use Topics    Alcohol use: Yes     Comment: 3 drinks/week      Problem (# of Occurrences) Relation (Name,Age of Onset)    Diabetes (1) Mother    Hypertension (1) Father    Hyperlipidemia (1) Father    Other - See Comments (1) Mother: hip fx    Colon Cancer (1) Paternal Grandmother              Current Outpatient Medications   Medication Sig Dispense Refill    Ascorbic Acid (VITAMIN C PO)       CALCIUM-VITAMIN D PO       citalopram (CELEXA) 10 MG tablet Take 1 tablet (10 mg) by mouth daily To be taken along with the 20mg for a total of 30mg daily 90 tablet 4    citalopram (CELEXA) 20 MG tablet TAKE 1 TABLET BY MOUTH EVERY DAY 90 tablet 4    MAGNESIUM PO       metroNIDAZOLE (METROGEL) 0.75 % external gel       metroNIDAZOLE (METROGEL) 1 % external gel Apply topically daily      VITAMIN D, CHOLECALCIFEROL, PO Take 1,000 Units by mouth daily      citalopram (CELEXA) 10 MG tablet TAKE 1 TABLET (10 MG) BY MOUTH DAILY TO BE TAKEN ALONG WITH THE 20MG FOR A TOTAL OF 30MG DAILY 90  "tablet 1    citalopram (CELEXA) 20 MG tablet TAKE 1 TABLET DAILY with 10 mg to equal 30 mg daily 90 tablet 2     No current facility-administered medications for this visit.     No Known Allergies    Past medical, surgical, social and family histories were reviewed and updated in EPIC.    EXAM:  /68   Ht 1.695 m (5' 6.75\")   Wt 69.9 kg (154 lb)   BMI 24.30 kg/m     BMI: Body mass index is 24.3 kg/m .    PHYSICAL EXAM:  Constitutional:   Appearance: Well nourished, well developed, alert, in no acute distress  Neck:  Lymph Nodes:  No lymphadenopathy present    Thyroid:  Gland size normal, nontender, no nodules or masses present  on palpation  Chest:  Respiratory Effort:  Breathing unlabored, CTAB  Cardiovascular:    Heart: Auscultation:  Regular rate, normal rhythm, no murmurs present  Breasts: Inspection of Breasts:  No lymphadenopathy present., Palpation of Breasts and Axillae:  No masses present on palpation, no breast tenderness., Axillary Lymph Nodes:  No lymphadenopathy present., and No nodularity, asymmetry or nipple discharge bilaterally. Left breat fibrocystic tissue >right but no mass or other abnormality  Gastrointestinal:   Abdominal Examination:  Abdomen nontender to palpation, tone normal without rigidity or guarding, no masses present, umbilicus without lesions   Liver and Spleen:  No hepatomegaly present, liver nontender to palpation    Hernias:  No hernias present  Lymphatic: Lymph Nodes:  No other lymphadenopathy present  Skin:  General Inspection:  No rashes present, no lesions present, no areas of  discoloration  Neurologic:    Mental Status:  Oriented X3.  Normal strength and tone, sensory exam                grossly normal, mentation intact and speech normal.    Psychiatric:   Mentation appears normal and affect normal/bright.         Pelvic Exam:  External Genitalia:     Normal appearance for age, no discharge present, no tenderness present, no inflammatory lesions present, color " normal  Vagina:     Normal vaginal vault without central or paravaginal defects, no discharge present, no inflammatory lesions present, no masses present  Bladder:     Nontender to palpation  Urethra:   Urethral Body:  Urethra palpation normal, urethra structural support normal   Urethral Meatus:  No erythema or lesions present  Cervix:     Appearance healthy, no lesions present, nontender to palpation, no bleeding present  Uterus:     Uterus: firm, normal sized and nontender, anteverted in position.   Adnexa:     No adnexal tenderness present, no adnexal masses present  Perineum:     Perineum within normal limits, no evidence of trauma, no rashes or skin lesions present  Anus:     Anus within normal limits, no hemorrhoids present  Inguinal Lymph Nodes:     No lymphadenopathy present  Pubic Hair:     Normal pubic hair distribution for age  Genitalia and Groin:     No rashes present, no lesions present, no areas of discoloration, no masses present    COUNSELING:   Reviewed preventive health counseling, as reflected in patient instructions       (Radha)menopause management    BMI: Body mass index is 24.3 kg/m .      ASSESSMENT:  53 year old female with satisfactory annual exam.    ICD-10-CM    1. Encounter for gynecological examination without abnormal finding  Z01.419       2. Anxiety  F41.9 citalopram (CELEXA) 10 MG tablet     citalopram (CELEXA) 20 MG tablet      3. Depression, major, in remission (H24)  F32.5 citalopram (CELEXA) 10 MG tablet     citalopram (CELEXA) 20 MG tablet      4. Irregular menses  N92.6       5. Perimenopausal symptom  N95.1           PLAN:    Pap is UTD for 3-4 more years.   Can follow routine ASCCP guidelines     Mammo today and assuming it is normal and the mastodynia continues to resolve then no additional follow up is needed.    Fasting labs nl last year, will repeat in 1-3 years depending on patient's health status and any changes.      Additional health issues addressed at today's  visit include:    Discussed (noris)menopause and typical course of v.m sx, metabolic changes and mood effects.  Reviewed that this has typically started for many by age 45-47 but can be even sooner for some, though later for others.     Patient is clearly having menstrual changes and v.m sx typical of perimenopause.  Sleep and metabolic changes certainly related though feels that overall she is fine with melatonin for the sleep and continuing to just focus on healthy eating and exercise.    On exam today did not notice any significant pelvic floor contracture or anatomic issues and no significant atrophy.    Do support her continuing to work with pfpt but do not think for that alone HRT is indicated.  She has other sx that would certainly be helped by HRT and given her menstrual irregularity would need to do cyclic HRT and try to time it to some degree of her menses.  AUB would be fairly likely since HRT not as helpful with cycle control.  Mirena IUD with transdermal E2 would be best option but patient declines this for now.  Will see how sx progress over the next few months and contact me if needed.    Anxiety and mild depression is well controlled on her current citalopram 30mg so refills sent in for the year.       Elba Worthy MD

## 2024-07-01 ENCOUNTER — ANCILLARY PROCEDURE (OUTPATIENT)
Dept: MAMMOGRAPHY | Facility: CLINIC | Age: 54
End: 2024-07-01
Payer: COMMERCIAL

## 2024-07-01 ENCOUNTER — OFFICE VISIT (OUTPATIENT)
Dept: OBGYN | Facility: CLINIC | Age: 54
End: 2024-07-01
Payer: COMMERCIAL

## 2024-07-01 VITALS
WEIGHT: 154 LBS | HEIGHT: 67 IN | DIASTOLIC BLOOD PRESSURE: 68 MMHG | SYSTOLIC BLOOD PRESSURE: 114 MMHG | BODY MASS INDEX: 24.17 KG/M2

## 2024-07-01 DIAGNOSIS — F32.5 DEPRESSION, MAJOR, IN REMISSION (H): ICD-10-CM

## 2024-07-01 DIAGNOSIS — N92.6 IRREGULAR MENSES: ICD-10-CM

## 2024-07-01 DIAGNOSIS — N95.1 PERIMENOPAUSAL SYMPTOM: ICD-10-CM

## 2024-07-01 DIAGNOSIS — Z12.31 VISIT FOR SCREENING MAMMOGRAM: ICD-10-CM

## 2024-07-01 DIAGNOSIS — Z01.419 ENCOUNTER FOR GYNECOLOGICAL EXAMINATION WITHOUT ABNORMAL FINDING: Primary | ICD-10-CM

## 2024-07-01 DIAGNOSIS — F41.9 ANXIETY: ICD-10-CM

## 2024-07-01 PROCEDURE — 99396 PREV VISIT EST AGE 40-64: CPT | Performed by: OBSTETRICS & GYNECOLOGY

## 2024-07-01 PROCEDURE — 77063 BREAST TOMOSYNTHESIS BI: CPT | Mod: TC | Performed by: RADIOLOGY

## 2024-07-01 PROCEDURE — 77067 SCR MAMMO BI INCL CAD: CPT | Mod: TC | Performed by: RADIOLOGY

## 2024-07-01 RX ORDER — METRONIDAZOLE 7.5 MG/G
GEL TOPICAL
COMMUNITY
Start: 2023-10-04

## 2024-07-01 ASSESSMENT — ANXIETY QUESTIONNAIRES
IF YOU CHECKED OFF ANY PROBLEMS ON THIS QUESTIONNAIRE, HOW DIFFICULT HAVE THESE PROBLEMS MADE IT FOR YOU TO DO YOUR WORK, TAKE CARE OF THINGS AT HOME, OR GET ALONG WITH OTHER PEOPLE: NOT DIFFICULT AT ALL
3. WORRYING TOO MUCH ABOUT DIFFERENT THINGS: NOT AT ALL
5. BEING SO RESTLESS THAT IT IS HARD TO SIT STILL: NOT AT ALL
GAD7 TOTAL SCORE: 2
2. NOT BEING ABLE TO STOP OR CONTROL WORRYING: NOT AT ALL
6. BECOMING EASILY ANNOYED OR IRRITABLE: NOT AT ALL
GAD7 TOTAL SCORE: 2
1. FEELING NERVOUS, ANXIOUS, OR ON EDGE: SEVERAL DAYS
7. FEELING AFRAID AS IF SOMETHING AWFUL MIGHT HAPPEN: SEVERAL DAYS

## 2024-07-01 ASSESSMENT — PATIENT HEALTH QUESTIONNAIRE - PHQ9
SUM OF ALL RESPONSES TO PHQ QUESTIONS 1-9: 0
5. POOR APPETITE OR OVEREATING: NOT AT ALL

## 2024-07-03 ENCOUNTER — MYC MEDICAL ADVICE (OUTPATIENT)
Dept: OBGYN | Facility: CLINIC | Age: 54
End: 2024-07-03
Payer: COMMERCIAL

## 2024-07-04 NOTE — TELEPHONE ENCOUNTER
There is no additional testing that is covered by insurance unless strong fhx, gene mutation, or gale or similar model of overall breast ca risk is >20-25%  This statement in the mammo is in every mammo now.  Nothing is changed from before and it is more of a covering of liability for the radiologists  So all young women are dense, some more than others, if any specific complaints or masses found then yes, o/w nothing more is recommended nor paid for

## 2024-07-14 DIAGNOSIS — F41.9 ANXIETY: ICD-10-CM

## 2024-07-14 DIAGNOSIS — F32.5 DEPRESSION, MAJOR, IN REMISSION (H): ICD-10-CM

## 2024-07-15 RX ORDER — CITALOPRAM HYDROBROMIDE 20 MG/1
TABLET ORAL
Qty: 90 TABLET | Refills: 2 | Status: SHIPPED | OUTPATIENT
Start: 2024-07-15

## 2024-07-15 NOTE — TELEPHONE ENCOUNTER
Requested Prescriptions   Pending Prescriptions Disp Refills    citalopram (CELEXA) 20 MG tablet [Pharmacy Med Name: CITALOPRAM HYDROBROMIDE TABS 20MG] 90 tablet 3     Sig: TAKE 1 TABLET DAILY       SSRIs Protocol Passed - 7/14/2024 11:38 PM        Passed - PHQ-9 score less than 5 in past 6 months     Please review last PHQ-9 score.           Passed - Medication is active on med list        Passed - TATIANA-7 score of less than 5 in past 6 months.     Please review last TATIANA-7 score.           Passed - Recent (12 mo) or future (90 days) visit within the authorizing provider's specialty     The patient must have completed an in-person or virtual visit within the past 12 months or has a future visit scheduled within the next 90 days with the authorizing provider s specialty.  Urgent care and e-visits do not quality as an office visit for this protocol.          Passed - Medication indicated for associated diagnosis     Medication is associated with one or more of the following diagnoses:             Anxiety            Bipolar            Depression            Obsessive-compulsive disorder            Panic disorder            Postmenopausal flushing            Premenstrual dysphoric disorder            Social phobia             Passed - Patient is age 18 or older        Passed - No active pregnancy on record        Passed - No positive pregnancy test in last 12 months           Last Written Prescription Date:  4/15/24  Last Fill Quantity: 90,  # refills: 0   Last office visit: 7/1/2024 ; last virtual visit: Visit date not found with prescribing provider:  Dr Worthy   Future Office Visit:        Sent to mail order pharmacy    Nakita Siu RN on 7/15/2024 at 1:43 PM

## 2024-07-17 DIAGNOSIS — F32.5 DEPRESSION, MAJOR, IN REMISSION (H): ICD-10-CM

## 2024-07-17 DIAGNOSIS — F41.9 ANXIETY: ICD-10-CM

## 2024-07-17 RX ORDER — CITALOPRAM HYDROBROMIDE 10 MG/1
10 TABLET ORAL DAILY
Qty: 90 TABLET | Refills: 1 | Status: SHIPPED | OUTPATIENT
Start: 2024-07-17

## 2024-07-17 NOTE — TELEPHONE ENCOUNTER
Requested Prescriptions   Pending Prescriptions Disp Refills    citalopram (CELEXA) 10 MG tablet [Pharmacy Med Name: CITALOPRAM HBR 10 MG TABLET] 90 tablet 0     Sig: TAKE 1 TABLET (10 MG) BY MOUTH DAILY TO BE TAKEN ALONG WITH THE 20MG FOR A TOTAL OF 30MG DAILY       SSRIs Protocol Passed - 7/17/2024 12:32 PM        Passed - PHQ-9 score less than 5 in past 6 months     Please review last PHQ-9 score.           Passed - Medication is active on med list        Passed - TATIANA-7 score of less than 5 in past 6 months.     Please review last TATIANA-7 score.           Passed - Recent (12 mo) or future (90 days) visit within the authorizing provider's specialty     The patient must have completed an in-person or virtual visit within the past 12 months or has a future visit scheduled within the next 90 days with the authorizing provider s specialty.  Urgent care and e-visits do not quality as an office visit for this protocol.          Passed - Medication indicated for associated diagnosis     Medication is associated with one or more of the following diagnoses:             Anxiety            Bipolar            Depression            Obsessive-compulsive disorder            Panic disorder            Postmenopausal flushing            Premenstrual dysphoric disorder            Social phobia             Passed - Patient is age 18 or older        Passed - No active pregnancy on record        Passed - No positive pregnancy test in last 12 months           Last Written Prescription Date:  4/15/24  Last Fill Quantity: #90, 0 refills   Last office visit: 7/1/24    Prescription approved per [facility] Refill Protocol.    Marley Cazares RN on 7/17/2024 at 12:35 PM

## 2024-07-21 DIAGNOSIS — F32.5 DEPRESSION, MAJOR, IN REMISSION (H): ICD-10-CM

## 2024-07-21 DIAGNOSIS — F41.9 ANXIETY: ICD-10-CM

## 2024-07-22 RX ORDER — CITALOPRAM HYDROBROMIDE 20 MG/1
TABLET ORAL
Qty: 90 TABLET | Refills: 2 | OUTPATIENT
Start: 2024-07-22

## 2024-07-22 NOTE — TELEPHONE ENCOUNTER
"Requested Prescriptions   Pending Prescriptions Disp Refills    citalopram (CELEXA) 20 MG tablet [Pharmacy Med Name: CITALOPRAM HBR 20 MG TABLET] 90 tablet 2     Sig: TAKE 1 TABLET BY MOUTH EVERY DAY       SSRIs Protocol Passed - 7/21/2024  8:33 AM        Passed - PHQ-9 score less than 5 in past 6 months     Please review last PHQ-9 score.           Passed - Medication is active on med list        Passed - TATIANA-7 score of less than 5 in past 6 months.     Please review last TATIANA-7 score.           Passed - Recent (12 mo) or future (90 days) visit within the authorizing provider's specialty     The patient must have completed an in-person or virtual visit within the past 12 months or has a future visit scheduled within the next 90 days with the authorizing provider s specialty.  Urgent care and e-visits do not quality as an office visit for this protocol.          Passed - Medication indicated for associated diagnosis     Medication is associated with one or more of the following diagnoses:             Anxiety            Bipolar            Depression            Obsessive-compulsive disorder            Panic disorder            Postmenopausal flushing            Premenstrual dysphoric disorder            Social phobia             Passed - Patient is age 18 or older        Passed - No active pregnancy on record        Passed - No positive pregnancy test in last 12 months           Last Written Prescription Date:  7/1524  Last Fill Quantity: 90,  # refills: 2  Last office visit: 7/1/2024 ; last virtual visit: Visit date not found with prescribing provider:  Zaynab   Future Office Visit:  NONE    Refill request refused due to :   Refills available at different pharmacy.  Request sent back to pharmacy as \"duplicate\"    Natasha May RN on 7/22/2024 at 6:28 AM                "

## 2024-08-05 RX ORDER — CITALOPRAM HYDROBROMIDE 20 MG/1
TABLET ORAL
Qty: 90 TABLET | Refills: 4 | Status: SHIPPED | OUTPATIENT
Start: 2024-08-05

## 2024-08-05 RX ORDER — CITALOPRAM HYDROBROMIDE 10 MG/1
10 TABLET ORAL DAILY
Qty: 90 TABLET | Refills: 4 | Status: SHIPPED | OUTPATIENT
Start: 2024-08-05

## 2025-05-27 ENCOUNTER — OFFICE VISIT (OUTPATIENT)
Dept: OBGYN | Facility: CLINIC | Age: 55
End: 2025-05-27
Payer: COMMERCIAL

## 2025-05-27 VITALS
DIASTOLIC BLOOD PRESSURE: 74 MMHG | BODY MASS INDEX: 27 KG/M2 | SYSTOLIC BLOOD PRESSURE: 124 MMHG | WEIGHT: 168 LBS | HEIGHT: 66 IN

## 2025-05-27 DIAGNOSIS — R41.89 BRAIN FOG: ICD-10-CM

## 2025-05-27 DIAGNOSIS — N95.1 PERIMENOPAUSE: ICD-10-CM

## 2025-05-27 DIAGNOSIS — N93.9 ABNORMAL UTERINE BLEEDING: Primary | ICD-10-CM

## 2025-05-27 PROCEDURE — 99459 PELVIC EXAMINATION: CPT

## 2025-05-27 PROCEDURE — 3074F SYST BP LT 130 MM HG: CPT

## 2025-05-27 PROCEDURE — 99213 OFFICE O/P EST LOW 20 MIN: CPT

## 2025-05-27 PROCEDURE — G2211 COMPLEX E/M VISIT ADD ON: HCPCS

## 2025-05-27 PROCEDURE — 3078F DIAST BP <80 MM HG: CPT

## 2025-05-27 NOTE — PATIENT INSTRUCTIONS
Thank you for visiting the [facility].    Today we discussed menstrual changes and perimenopausal brain fog.     It was recommended that you complete a pelvic ultrasound for further evaluation of any structural causes of your change in bleeding. We will complete a phone call to discuss the results and recommendations of this imaging and options to control your bleeding after it is complete.     Please do not hesitate to contact the office if you have any questions or concerns.

## 2025-05-27 NOTE — PROGRESS NOTES
SUBJECTIVE:                                                   Mariana Peters is a 54 year old female who presents to clinic today for the following health issue(s):  Patient presents with:  Other    Additional information: menopause symptoms     HPI:  Mariana is a 53 yo  female with PMHx anxiety/depression, breast tenderness who presents to discuss perimenopausal changes.     She has been a patient of Dr. Worthy at our clinic, had discussed menstrual changes at her last annual exam in 2024. Over the past few years her cycles have become shorter (q 19 days) with some longer stretches between (6-7 weeks). Over the past 2 months, she has begun to have prolonged stretched of light bleeding/spotting which will last a few weeks at a time.     She has additionally begun to experience increasing brain fog which has been bothersome to her, disruptive to work as a teacher. Hot flashes and night sweats that she experienced previously have subsided, well managed with conservative measures at this time.     She denies any change in vaginal discharge, odor, itching, or irritation. No change in stress, diet, exercise, or products to vulvovaginal tissues.     Partner has vasectomy.     Patient denies hx of breast cancer, liver disease, blood clots, tobacco use, migraine with aura.     Patient's last menstrual period was 2025..     Patient is sexually active, .  Using vasectomy for contraception.    reports that she has never smoked. She has never used smokeless tobacco.    STD testing offered?  Declined    Health maintenance updated:  yes    Today's PHQ-2 Score:       2024     4:52 PM   PHQ-2 (  [employer])   Q1: Little interest or pleasure in doing things 0   Q2: Feeling down, depressed or hopeless 0   PHQ-2 Score 0     Today's PHQ-9 Score:       2024     4:52 PM   PHQ-9 SCORE   PHQ-9 Total Score 0     Today's TATIANA-7 Score:       2024     4:52 PM   TATIANA-7 SCORE   Total Score 2       Problem list  and histories reviewed & adjusted, as indicated.  Additional history: as documented.    Patient Active Problem List   Diagnosis    Rosacea    Anxiety    Depression, major, in remission    Allergic rhinitis    Elevated liver enzymes     Past Surgical History:   Procedure Laterality Date     SECTION      CHOLECYSTECTOMY  2003    COLONOSCOPY N/A 2022    Procedure: COLONOSCOPY;  Surgeon: Lee Ann Deluca MD;  Location:  GI    HEMORRHOIDECTOMY  2023    patient has had x 2 of these surgeries    HERNIA REPAIR        Social History     Tobacco Use    Smoking status: Never    Smokeless tobacco: Never   Substance Use Topics    Alcohol use: Yes     Comment: 3 drinks/week      Problem (# of Occurrences) Relation (Name,Age of Onset)    Diabetes (1) Mother    Hypertension (1) Father    Hyperlipidemia (1) Father    Other - See Comments (1) Mother: hip fx    Colon Cancer (1) Paternal Grandmother              Current Outpatient Medications   Medication Sig Dispense Refill    Ascorbic Acid (VITAMIN C PO)       CALCIUM-VITAMIN D PO       citalopram (CELEXA) 10 MG tablet Take 1 tablet (10 mg) by mouth daily To be taken along with the 20mg for a total of 30mg daily 90 tablet 4    citalopram (CELEXA) 10 MG tablet TAKE 1 TABLET (10 MG) BY MOUTH DAILY TO BE TAKEN ALONG WITH THE 20MG FOR A TOTAL OF 30MG DAILY 90 tablet 1    citalopram (CELEXA) 20 MG tablet TAKE 1 TABLET BY MOUTH EVERY DAY 90 tablet 4    citalopram (CELEXA) 20 MG tablet TAKE 1 TABLET DAILY with 10 mg to equal 30 mg daily 90 tablet 2    MAGNESIUM PO       metroNIDAZOLE (METROGEL) 0.75 % external gel       metroNIDAZOLE (METROGEL) 1 % external gel Apply topically daily      VITAMIN D, CHOLECALCIFEROL, PO Take 1,000 Units by mouth daily       No current facility-administered medications for this visit.     No Known Allergies    ROS:  12 point review of systems negative other than symptoms noted below or in the HPI.  No urinary frequency or dysuria,  "bladder or kidney problems    OBJECTIVE:     /74   Ht 1.676 m (5' 6\")   Wt 76.2 kg (168 lb)   LMP 05/24/2025   BMI 27.12 kg/m    Body mass index is 27.12 kg/m .    Exam:  Constitutional:  Appearance: Well nourished, well developed alert, in no acute distress  Neurologic:  Mental Status:  Oriented X3.  Normal strength and tone, sensory exam grossly normal, mentation intact and speech normal.    Psychiatric:  Mentation appears normal and affect normal/bright.  Pelvic Exam:  External Genitalia:     Normal appearance for age, no discharge present, no tenderness present, no inflammatory lesions present, color normal  Vagina:     Normal vaginal vault without central or paravaginal defects, no discharge present, no inflammatory lesions present, no masses present, scant brown blood in vaginal vault  Bladder:     Nontender to palpation  Urethra:   Urethral Body:  Urethra palpation normal, urethra structural support normal   Urethral Meatus:  No erythema or lesions present  Cervix:     Appearance healthy, no lesions present, nontender to palpation, scant bleeding present, no evidence of polyp/structural changes  Uterus:     Uterus: firm, normal sized and nontender  Adnexa:     No adnexal tenderness present, no adnexal masses present  Perineum:     Perineum within normal limits, no evidence of trauma, no rashes or skin lesions present  Anus:     Anus within normal limits, no hemorrhoids present  Inguinal Lymph Nodes:     No lymphadenopathy present  Pubic Hair:     Normal pubic hair distribution for age  Genitalia and Groin:     No rashes present, no lesions present, no areas of discoloration, no masses present     In-Clinic Test Results:  No results found for this or any previous visit (from the past 24 hours).    ASSESSMENT/PLAN:                                                        ICD-10-CM    1. Abnormal uterine bleeding  N93.9 US Pelvic Complete with Transvaginal     CANCELED: US Pelvic Complete with " Transvaginal      2. Perimenopause  N95.1 US Pelvic Complete with Transvaginal     CANCELED: US Pelvic Complete with Transvaginal      3. Brain fog  R41.89           Patient Instructions   Thank you for visiting the [facility].    Today we discussed menstrual changes and perimenopausal brain fog.     It was recommended that you complete a pelvic ultrasound for further evaluation of any structural causes of your change in bleeding. We will complete a phone call to discuss the results and recommendations of this imaging and options to control your bleeding after it is complete.     Please do not hesitate to contact the office if you have any questions or concerns.     Mariana is a 55 yo female with PMHx anxiety/depression, breast tenderness who presents to discuss perimenopausal changes (abnormal bleeding / brain fog).    Abnormal Uterine Bleeding / Brain Fog  - discussed symptoms may be a result of perimenopausal hormone changes alone, but should rule out structural changes as sx have been gradually increasing over time  - ordered TVUS for evaluation, phone visit 24 hours after imaging to discuss results and recommendations  - if no structural cause of bleeding, discussed options for cycle control with possibility of hormone therapy as needed for brain fog and/or other symptoms in future  - Reviewed the WHI and the more recent extension data after 20 yrs since the WHI on the pros/cons, as well as short and long term safety of HRT.  - Reviewed the studied population in the WHI and the indication for start of HRT and the years out from menopause in that group vs how HRT would typically be used.  - Reviewed recommendations for lowest dose and shortest amount of time possible for HRT but also within the parameters of QOL.  - Discussed risks for hypercoagulability and increased risk of stroke, VTE, and CVD.   - discussed Mirena IUD +/- transdermal estrogen patch or low dose OCP  - discussed HRT  alone (transdermal/oral estrogen + oral progesterone) may result in further AUB / not provide the level of bleeding control she would get with other methods    OSMANY Bhakta [facility] [place]

## 2025-06-02 ENCOUNTER — ANCILLARY PROCEDURE (OUTPATIENT)
Dept: ULTRASOUND IMAGING | Facility: CLINIC | Age: 55
End: 2025-06-02
Payer: COMMERCIAL

## 2025-06-02 DIAGNOSIS — N95.1 PERIMENOPAUSE: ICD-10-CM

## 2025-06-02 DIAGNOSIS — N93.9 ABNORMAL UTERINE BLEEDING: ICD-10-CM

## 2025-06-02 PROCEDURE — 76830 TRANSVAGINAL US NON-OB: CPT | Performed by: STUDENT IN AN ORGANIZED HEALTH CARE EDUCATION/TRAINING PROGRAM

## 2025-06-02 NOTE — PROGRESS NOTES
Mariana Peters is a 54 year old female who is being evaluated via a patient initiated billable telephone visit.     What phone number would you like to be contacted at? [phone]  How would you like to obtain your AVS? -Melodie    SUBJECTIVE:                                                   Mariana Peters is a 54 year old female who presents for virtual visit today for the following health issue(s):  Patient presents with:  Follow Up: Follow up to ultrasound.     Virtual Visit Details    Type of service:  Telephone Visit   Phone call duration: 17 minutes   Originating Location (pt. Location): [place], [place]    Distant Location (provider location):  On-site  Telephone visit completed due to the patient did not consent to a video visit.    HPI:  Mariana is a 53 yo  female with PMHx anxiety/depression, breast tenderness who presents via phone visit for US follow up completed for perimenopausal bleeding changes.    First seen by myself on  for these symptoms. She has had shortening cycles over the past few years, but in the last 2 months has had prolonged stretched of light bleeding/spotting which will last a few weeks at a time. She has additionally experienced increasing brain fog, hot flashes, night sweats.     Discussed options that would provide both hormone therapy and cycle control as hormone therapy along would likely increase DUB. TVUS was ordered at time of last visit to evaluate for any structural changes.     Results for orders placed or performed in visit on 25   US Transvaginal Non OB    Narrative    Table formatting from the original result was not included.    Gynecological Ultrasound Report  Pelvic U/S - Transvaginal  [facility]  Referring Provider: Carmen Leon   Sonographer:  Brittnee Spurling, Registered Diagnostic Medical   Sonographer, RDMS  Indication: Bleeding/Menses- Abnormal uterine bleeding (AUB)  LMP: Patient's last menstrual period was  2025.  History:   Gynecological Ultrasonography:   Uterus: retroflexed. Contour is smooth/regular.  Size: 5.7 x 4.9 x 5.1 cm  Endometrium: Thickness Total 10.4 mm  Findings: cystic structure within endometrium 0.4 x 0.3 cm  Right Ovary: 2.2 x 1.5 x 1.7 cm. Wnl  Left Ovary: 4.2 x 3.4 x 3.1 cm. Complex cyst 3.1 x 2.5 x 2.7 cm  Cul de Sac Free Fluid: No free fluid  Technique: Transvaginal Imaging performed     Impression:   The uterus was visualized and appears normal. The endometrium appeared   normal but contains a small anechoic cystic structure with the appearance   of a gestational sac. Cannot rule out pregnancy on US. Recommend clinical   correlation and possible endometrial sampling and/or direct visualization   with hysteroscopy.   A unilocular complex fluid filled cyst with irregular internal borders and   possible solid component was noted in the left ovary, measuring 3.1cm.   Advise MRI follow-up.   Right ovary is normal in appearance.     Janey Fleming MD, S  25          Patient's last menstrual period was 2025.    Patient is sexually active, .  Using vasectomy for contraception.    reports that she has never smoked. She has never used smokeless tobacco.    Health maintenance updated:  yes    Today's PHQ-2 Score:       2024     4:52 PM   PHQ-2 (  [employer])   Q1: Little interest or pleasure in doing things 0   Q2: Feeling down, depressed or hopeless 0   PHQ-2 Score 0     Today's PHQ-9 Score:       2024     4:52 PM   PHQ-9 SCORE   PHQ-9 Total Score 0     Today's TATIANA-7 Score:       2024     4:52 PM   TATIANA-7 SCORE   Total Score 2       Problem list and histories reviewed & adjusted, as indicated.  Additional history: as documented.    Patient Active Problem List   Diagnosis    Rosacea    Anxiety    Depression, major, in remission    Allergic rhinitis    Elevated liver enzymes     Past Surgical History:   Procedure Laterality Date     SECTION       CHOLECYSTECTOMY  06/01/2003    COLONOSCOPY N/A 07/18/2022    Procedure: COLONOSCOPY;  Surgeon: Lee Ann Deluca MD;  Location: [facility]    HEMORRHOIDECTOMY  12/2023    patient has had x 2 of these surgeries    HERNIA REPAIR        Social History     Tobacco Use    Smoking status: Never    Smokeless tobacco: Never   Substance Use Topics    Alcohol use: Yes     Comment: 3 drinks/week      Problem (# of Occurrences) Relation (Name,Age of Onset)    Diabetes (1) Mother    Hypertension (1) Father    Hyperlipidemia (1) Father    Other - See Comments (1) Mother: hip fx    Colon Cancer (1) Paternal Grandmother              Current Outpatient Medications   Medication Sig Dispense Refill    Ascorbic Acid (VITAMIN C PO)       CALCIUM-VITAMIN D PO       citalopram (CELEXA) 10 MG tablet Take 1 tablet (10 mg) by mouth daily To be taken along with the 20mg for a total of 30mg daily 90 tablet 4    citalopram (CELEXA) 10 MG tablet TAKE 1 TABLET (10 MG) BY MOUTH DAILY TO BE TAKEN ALONG WITH THE 20MG FOR A TOTAL OF 30MG DAILY 90 tablet 1    citalopram (CELEXA) 20 MG tablet TAKE 1 TABLET BY MOUTH EVERY DAY 90 tablet 4    citalopram (CELEXA) 20 MG tablet TAKE 1 TABLET DAILY with 10 mg to equal 30 mg daily 90 tablet 2    MAGNESIUM PO       metroNIDAZOLE (METROGEL) 0.75 % external gel       metroNIDAZOLE (METROGEL) 1 % external gel Apply topically daily      VITAMIN D, CHOLECALCIFEROL, PO Take 1,000 Units by mouth daily       No current facility-administered medications for this visit.     No Known Allergies    OBJECTIVE:     No vitals were obtained today due to virtual telephone visit.    Physical Exam  GENERAL: alert and no distress  EYES: Eyes grossly normal to inspection.  No discharge or erythema, or obvious scleral/conjunctival abnormalities.  NEURO: Cranial nerves grossly intact.  Mentation and speech appropriate for age.  PSYCH: Appropriate affect, tone, and pace of words    ASSESSMENT/PLAN:                                                       Phone call duration: 17 minutes      ICD-10-CM    1. Complex cyst of left ovary  N83.292 MR Pelvis (GYN) wo & w Contrast          Patient Instructions   Thank you for completing a phone visit with the [facility].    Please do not hesitate to contact the office if you have any questions or concerns.     Mariana is a 55 yo  female with PMHx anxiety/depression, breast tenderness who presents via phone visit for US follow up completed for perimenopausal bleeding changes.    - discussed results of US including cystic structure in endometrium and complex cyst of left ovary  - low suspicion for pregnancy, patient is monogamous with male partner who has completed vasectomy and confirmation testing   - will have patient schedule hysteroscopy consult with OB/GYN physician to discuss further evaluation of this area and possible contribution to bleeding   - will also order pelvic MRI to further evaluate complex left ovarian cyst   - follow up to discuss hormone therapy pending results - could consider IUD during future hysteroscopy if desired     Carmen Leon PA-C  [facility] [place]

## 2025-06-03 ENCOUNTER — RESULTS FOLLOW-UP (OUTPATIENT)
Dept: OBGYN | Facility: CLINIC | Age: 55
End: 2025-06-03

## 2025-06-03 ENCOUNTER — VIRTUAL VISIT (OUTPATIENT)
Dept: OBGYN | Facility: CLINIC | Age: 55
End: 2025-06-03
Payer: COMMERCIAL

## 2025-06-03 DIAGNOSIS — N83.292 COMPLEX CYST OF LEFT OVARY: Primary | ICD-10-CM

## 2025-06-03 PROCEDURE — 98013 SYNCH AUDIO-ONLY EST LOW 20: CPT

## 2025-06-03 NOTE — PATIENT INSTRUCTIONS
Thank you for completing a phone visit with the [facility].    Please do not hesitate to contact the office if you have any questions or concerns.

## 2025-06-10 ENCOUNTER — OFFICE VISIT (OUTPATIENT)
Dept: OBGYN | Facility: CLINIC | Age: 55
End: 2025-06-10
Payer: COMMERCIAL

## 2025-06-10 VITALS — BODY MASS INDEX: 26.79 KG/M2 | DIASTOLIC BLOOD PRESSURE: 74 MMHG | WEIGHT: 166 LBS | SYSTOLIC BLOOD PRESSURE: 122 MMHG

## 2025-06-10 DIAGNOSIS — R93.89 THICKENED ENDOMETRIUM: ICD-10-CM

## 2025-06-10 DIAGNOSIS — N93.9 ABNORMAL UTERINE BLEEDING (AUB): Primary | ICD-10-CM

## 2025-06-10 DIAGNOSIS — N83.202 LEFT OVARIAN CYST: ICD-10-CM

## 2025-06-10 LAB — HCG INTACT+B SERPL-ACNC: <1 MIU/ML

## 2025-06-10 PROCEDURE — 3078F DIAST BP <80 MM HG: CPT | Performed by: OBSTETRICS & GYNECOLOGY

## 2025-06-10 PROCEDURE — 36415 COLL VENOUS BLD VENIPUNCTURE: CPT | Performed by: OBSTETRICS & GYNECOLOGY

## 2025-06-10 PROCEDURE — 99214 OFFICE O/P EST MOD 30 MIN: CPT | Performed by: OBSTETRICS & GYNECOLOGY

## 2025-06-10 PROCEDURE — 3074F SYST BP LT 130 MM HG: CPT | Performed by: OBSTETRICS & GYNECOLOGY

## 2025-06-10 PROCEDURE — 84702 CHORIONIC GONADOTROPIN TEST: CPT | Performed by: OBSTETRICS & GYNECOLOGY

## 2025-06-10 RX ORDER — ALPRAZOLAM 1 MG/1
1 TABLET ORAL ONCE
Qty: 1 TABLET | Refills: 0 | Status: SHIPPED | OUTPATIENT
Start: 2025-06-10 | End: 2025-06-10

## 2025-06-10 NOTE — PROGRESS NOTES
SUBJECTIVE:                                                   Mariana Peters is a 54 year old female who presents to clinic today for the following health issue(s):  Patient presents with:  Consult      Additional information: Hysteroscopy      Last seen by Autumn Leon PA-C for perimenopausal bleeding.  Pelvic ultrasound:  Results for orders placed or performed in visit on 06/02/25   US Transvaginal Non OB     Narrative     Table formatting from the original result was not included.     Gynecological Ultrasound Report  Pelvic U/S - Transvaginal  [facility]  Referring Provider: Carmen Leon   Sonographer:  Brittnee Spurling, Registered Diagnostic Medical   Sonographer, [facility]  Indication: Bleeding/Menses- Abnormal uterine bleeding (AUB)  LMP: Patient's last menstrual period was 05/24/2025.  History:   Gynecological Ultrasonography:   Uterus: retroflexed. Contour is smooth/regular.  Size: 5.7 x 4.9 x 5.1 cm  Endometrium: Thickness Total 10.4 mm  Findings: cystic structure within endometrium 0.4 x 0.3 cm  Right Ovary: 2.2 x 1.5 x 1.7 cm. Wnl  Left Ovary: 4.2 x 3.4 x 3.1 cm. Complex cyst 3.1 x 2.5 x 2.7 cm  Cul de Sac Free Fluid: No free fluid  Technique: Transvaginal Imaging performed     Impression:   The uterus was visualized and appears normal. The endometrium appeared   normal but contains a small anechoic cystic structure with the appearance   of a gestational sac. Cannot rule out pregnancy on US. Recommend clinical   correlation and possible endometrial sampling and/or direct visualization   with hysteroscopy.   A unilocular complex fluid filled cyst with irregular internal borders and   possible solid component was noted in the left ovary, measuring 3.1cm.   Advise MRI follow-up.   Right ovary is normal in appearance.        History of Present Illness-    Mariana reports experiencing irregular menstrual cycles for a while, consistent with perimenopause. She describes periods of  amenorrhea lasting a couple of months, followed by frequent menstruation every 18 to 20 days for several months. In the last 2 to 3 months, she has noticed intermittent bleeding characterized by brown spotting between periods. Her periods have become lighter, with dark brown discharge and tissue, resembling old blood. She experiences spotting after workouts and occasionally uses tampons to manage the brownish discharge.    She has a history of suspected ovarian cysts, particularly on the left ovary, though none have been visually confirmed.    Has pelvic MRI tomorrow evening. Never had one before. No hx clausterphobia.          Patient's last menstrual period was 2025..     Patient is sexually active, .  Using vasectomy for contraception.    reports that she has never smoked. She has never used smokeless tobacco.    STD testing offered?  Declined    Health maintenance updated:  yes    Today's PHQ-2 Score:       2024     4:52 PM   PHQ-2 (  [employer])   Q1: Little interest or pleasure in doing things 0   Q2: Feeling down, depressed or hopeless 0   PHQ-2 Score 0     Today's PHQ-9 Score:       2024     4:52 PM   PHQ-9 SCORE   PHQ-9 Total Score 0     Today's TATIANA-7 Score:       2024     4:52 PM   TATIANA-7 SCORE   Total Score 2       Problem list and histories reviewed & adjusted, as indicated.  Additional history: as documented.    Patient Active Problem List   Diagnosis    Rosacea    Anxiety    Depression, major, in remission    Allergic rhinitis    Elevated liver enzymes     Past Surgical History:   Procedure Laterality Date     SECTION      x 2    CHOLECYSTECTOMY  2003    COLONOSCOPY N/A 2022    Procedure: COLONOSCOPY;  Surgeon: Lee Ann Deluca MD;  Location:  GI    HEMORRHOIDECTOMY  2023    patient has had x 2 of these surgeries    HERNIA REPAIR        Social History     Tobacco Use    Smoking status: Never    Smokeless tobacco: Never   Substance Use Topics     Alcohol use: Yes     Comment: 3 drinks/week      Problem (# of Occurrences) Relation (Name,Age of Onset)    Diabetes (1) Mother    Hypertension (1) Father    Hyperlipidemia (1) Father    Other - See Comments (1) Mother: hip fx    Colon Cancer (1) Paternal Grandmother (85)              Current Outpatient Medications   Medication Sig Dispense Refill    ALPRAZolam (XANAX) 1 MG tablet Take 1 tablet (1 mg) by mouth once for 1 dose. 45min prior to procedure 1 tablet 0    Ascorbic Acid (VITAMIN C PO)       CALCIUM-VITAMIN D PO       citalopram (CELEXA) 10 MG tablet Take 1 tablet (10 mg) by mouth daily To be taken along with the 20mg for a total of 30mg daily 90 tablet 4    citalopram (CELEXA) 10 MG tablet TAKE 1 TABLET (10 MG) BY MOUTH DAILY TO BE TAKEN ALONG WITH THE 20MG FOR A TOTAL OF 30MG DAILY 90 tablet 1    citalopram (CELEXA) 20 MG tablet TAKE 1 TABLET BY MOUTH EVERY DAY 90 tablet 4    citalopram (CELEXA) 20 MG tablet TAKE 1 TABLET DAILY with 10 mg to equal 30 mg daily 90 tablet 2    MAGNESIUM PO       metroNIDAZOLE (METROGEL) 0.75 % external gel       metroNIDAZOLE (METROGEL) 1 % external gel Apply topically daily      VITAMIN D, CHOLECALCIFEROL, PO Take 1,000 Units by mouth daily       No current facility-administered medications for this visit.     No Known Allergies    ROS:  12 point review of systems negative other than symptoms noted below or in the HPI.  No urinary frequency or dysuria, bladder or kidney problems      OBJECTIVE:     /74   Wt 75.3 kg (166 lb)   LMP 06/08/2025   BMI 26.79 kg/m    Body mass index is 26.79 kg/m .    Exam:  Constitutional:  Appearance: Well nourished, well developed alert, in no acute distress  Psychiatric:  Mentation appears normal and affect normal/bright.     ASSESSMENT/PLAN:                                                        ICD-10-CM    1. Abnormal uterine bleeding (AUB)  N93.9 hCG Quantitative Pregnancy     hCG Quantitative Pregnancy     ALPRAZolam (XANAX) 1 MG  tablet      2. Left ovarian cyst  N83.202       3. Thickened endometrium  R93.89 hCG Quantitative Pregnancy     hCG Quantitative Pregnancy          Patient Instructions   -Take ibuprofen 800mg and xanax 1mg 45min prior to arrival.   -Bring a     Assessment & Plan  robbie HINTON perimenopausal, LOV cyst complex.  - Irregular bleeding likely related to perimenopause. Presence of a complex cystic structure in the left ovary, which is common and not immediately concerning. Low likelihood of significant pathology such as ovarian cancer. However has MRI scheduled tomorrow.  -Plan endometrial biopsy. Follow-up appointment scheduled for June 18, 2025, to discuss results and potential management options for bleeding.  - The biopsy procedure may cause discomfort similar to severe period cramps. Options for pain management include taking ibuprofen 800mg ahead of time and using a heating pad. A sedative like Xanax can be considered to help with discomfort. She desires the tx with xanax. this will be ordered, take 45min prior to procedure along with ibuprofen and bring a .    Potential pregnancy:  - Cystic structure in the uterus could indicate a potential pregnancy. Need to rule out pregnancy due to vasectomy history, acknowledging that no sterilization procedure is 100% effective.  - Perform HCG test today to rule out pregnancy. If positive, reconsider MRI and will need to pursue evaluation/tx for ectopic pregnancy.      Patient was informed of AI scribe and agreed to its use.      Asuncion Chapa Masters, DO  [facility] [place]

## 2025-06-11 ENCOUNTER — HOSPITAL ENCOUNTER (OUTPATIENT)
Dept: MRI IMAGING | Facility: CLINIC | Age: 55
Discharge: HOME | End: 2025-06-11
Payer: COMMERCIAL

## 2025-06-11 ENCOUNTER — RESULTS FOLLOW-UP (OUTPATIENT)
Dept: OBGYN | Facility: CLINIC | Age: 55
End: 2025-06-11

## 2025-06-11 DIAGNOSIS — N83.292 COMPLEX CYST OF LEFT OVARY: ICD-10-CM

## 2025-06-11 PROCEDURE — 255N000002 HC RX 255 OP 636

## 2025-06-11 PROCEDURE — 72197 MRI PELVIS W/O & W/DYE: CPT

## 2025-06-11 PROCEDURE — A9585 GADOBUTROL INJECTION: HCPCS

## 2025-06-11 RX ORDER — GADOBUTROL 604.72 MG/ML
0.1 INJECTION INTRAVENOUS ONCE
Status: COMPLETED | OUTPATIENT
Start: 2025-06-11 | End: 2025-06-11

## 2025-06-11 RX ADMIN — GADOBUTROL 7.53 ML: 604.72 INJECTION INTRAVENOUS at 20:14

## 2025-06-13 ENCOUNTER — RESULTS FOLLOW-UP (OUTPATIENT)
Dept: OBGYN | Facility: CLINIC | Age: 55
End: 2025-06-13

## 2025-06-18 ENCOUNTER — OFFICE VISIT (OUTPATIENT)
Dept: OBGYN | Facility: CLINIC | Age: 55
End: 2025-06-18
Payer: COMMERCIAL

## 2025-06-18 VITALS — WEIGHT: 165 LBS | BODY MASS INDEX: 26.63 KG/M2 | SYSTOLIC BLOOD PRESSURE: 118 MMHG | DIASTOLIC BLOOD PRESSURE: 78 MMHG

## 2025-06-18 DIAGNOSIS — N93.9 ABNORMAL UTERINE BLEEDING (AUB): Primary | ICD-10-CM

## 2025-06-18 DIAGNOSIS — Z01.812 PRE-PROCEDURE LAB EXAM: ICD-10-CM

## 2025-06-18 LAB — HCG UR QL: NEGATIVE

## 2025-06-18 PROCEDURE — 58100 BIOPSY OF UTERUS LINING: CPT | Performed by: OBSTETRICS & GYNECOLOGY

## 2025-06-18 PROCEDURE — 3074F SYST BP LT 130 MM HG: CPT | Performed by: OBSTETRICS & GYNECOLOGY

## 2025-06-18 PROCEDURE — 81025 URINE PREGNANCY TEST: CPT | Performed by: OBSTETRICS & GYNECOLOGY

## 2025-06-18 PROCEDURE — 3078F DIAST BP <80 MM HG: CPT | Performed by: OBSTETRICS & GYNECOLOGY

## 2025-06-18 NOTE — PROGRESS NOTES
INDICATIONS:                                                    Is a pregnancy test required: Yes.  Was it positive or negative?  Negative  Was a consent obtained?  Yes    Having endometrial biopsy for abnormal uterine bleeding    Patient accompanied by partner.  Took ibuprofen and xanax as prescribed for pain control.     PROCEDURE;                                                      A speculum was placed in the vagina and cervix prepped with betadine. A tenaculum was attached to the cervix. A small plastic 5 mm Pipelle syringe curette was inserted into the cervical canal. The uterus was sounded to 9.5 cm's. A vigorous four quadrant biopsy was performed, removing amount large of tissue. The speculum was removed. This tissue was placed in [place] and sent to pathology.    The patient tolerated the procedure  well and she reported there was  cramping.      POST PROCEDURE;                                                      There  was no cramping at the time of discharge. She  tolerated the procedure well. There were no complications. Patient was discharged in stable condition.    Patient advised to call the clinic if severe pelvic pain, fever or heavy bleeding.    Scheduled for discussion of results and treatment steps next week via video visit.     Asuncion Chapa Masters, DO

## 2025-06-24 ENCOUNTER — VIRTUAL VISIT (OUTPATIENT)
Dept: OBGYN | Facility: CLINIC | Age: 55
End: 2025-06-24
Payer: COMMERCIAL

## 2025-06-24 DIAGNOSIS — N93.9 ABNORMAL UTERINE BLEEDING (AUB): Primary | ICD-10-CM

## 2025-06-24 DIAGNOSIS — R92.343 EXTREMELY DENSE TISSUE OF BOTH BREASTS ON MAMMOGRAPHY: ICD-10-CM

## 2025-06-24 DIAGNOSIS — N83.209 CYST OF OVARY, UNSPECIFIED LATERALITY: ICD-10-CM

## 2025-06-24 PROCEDURE — 98006 SYNCH AUDIO-VIDEO EST MOD 30: CPT | Performed by: OBSTETRICS & GYNECOLOGY

## 2025-06-24 RX ORDER — PROGESTERONE 200 MG/1
200 CAPSULE ORAL AT BEDTIME
Qty: 90 CAPSULE | Refills: 4 | Status: SHIPPED | OUTPATIENT
Start: 2025-06-24

## 2025-06-24 NOTE — PROGRESS NOTES
"Mariana Peters is a 54 year old female who is being evaluated for a billable video visit.     What phone number would you like to be contacted at? [phone]  How would you like to obtain your AVS? Lissetharluz      Originating Location (pt. Location): Home      Distant Location (provider location):  On-site center women      SUBJECTIVE:                                                   Mariana Peters is a 54 year old female who presents for virtual visit today for the following health issue(s):  Patient presents with:  Follow Up: Ongoing Mild Bleeding      Additional information: The patient stated that she still has mild bleeding.    History of Present Illness-  Irregular Bleeding and Perimenopausal Symptoms    Mariana has been experiencing irregular bleeding patterns, which have been exacerbated by a recent biopsy procedure. She reports that the bleeding continues several days post-procedure.    She has experienced night sweats and occasional hot flashes in the past, but these symptoms have significantly decreased. Mariana does not report any menopausal symptoms such as hot flashes or night sweats at present.    She is considering hormonal management options for her bleeding, including oral progesterone and possibly an IUD in the future.    Cyst  She is asymptomatic from her ovarian cyst    Sleep and Anxiety  Mariana reports that she is currently sleeping well, aided by a low dose of melatonin. She previously had difficulty falling asleep, but with her anxiety under control, she now considers herself a \"sleeping champ.\"    Breast Health  Mariana has dense breast tissue, categorized as extremely denser. She has no family history of breast cancer and has not undergone genetic testing for breast cancer risk. She is due for a mammogram and is considering additional screening options due to her breast density.    Appointments with Primary Care Providers  Mariana is advised to transition to a primary care doctor for " preventative care, including management of hypertension, diabetes, cholesterol, and other conditions that may arise as she approaches menopause.      Patient's last menstrual period was 2025..     Patient is sexually active, .  Using vasectomy for contraception.    reports that she has never smoked. She has never used smokeless tobacco.      Health maintenance updated:  yes    Today's PHQ-2 Score:       2024     4:52 PM   PHQ-2 (  [employer])   Q1: Little interest or pleasure in doing things 0   Q2: Feeling down, depressed or hopeless 0   PHQ-2 Score 0     Today's PHQ-9 Score:       2024     4:52 PM   PHQ-9 SCORE   PHQ-9 Total Score 0     Today's TATIANA-7 Score:       2024     4:52 PM   TATIANA-7 SCORE   Total Score 2       Problem list and histories reviewed & adjusted, as indicated.  Additional history: as documented.    Patient Active Problem List   Diagnosis    Rosacea    Anxiety    Depression, major, in remission    Allergic rhinitis    Elevated liver enzymes     Past Surgical History:   Procedure Laterality Date     SECTION      x 2    CHOLECYSTECTOMY  2003    COLONOSCOPY N/A 2022    Procedure: COLONOSCOPY;  Surgeon: Lee Ann Deluca MD;  Location: [facility]    HEMORRHOIDECTOMY  2023    patient has had x 2 of these surgeries    HERNIA REPAIR        Social History     Tobacco Use    Smoking status: Never    Smokeless tobacco: Never   Substance Use Topics    Alcohol use: Yes     Comment: 3 drinks/week      Problem (# of Occurrences) Relation (Name,Age of Onset)    Diabetes (1) Mother    Hypertension (1) Father    Hyperlipidemia (1) Father    Other - See Comments (1) Mother: hip fx    Colon Cancer (1) Paternal Grandmother (85)              Current Outpatient Medications   Medication Sig Dispense Refill    progesterone (PROMETRIUM) 200 MG capsule Take 1 capsule (200 mg) by mouth at bedtime. 90 capsule 4    Ascorbic Acid (VITAMIN C PO)       CALCIUM-VITAMIN D PO        citalopram (CELEXA) 10 MG tablet Take 1 tablet (10 mg) by mouth daily To be taken along with the 20mg for a total of 30mg daily 90 tablet 4    citalopram (CELEXA) 10 MG tablet TAKE 1 TABLET (10 MG) BY MOUTH DAILY TO BE TAKEN ALONG WITH THE 20MG FOR A TOTAL OF 30MG DAILY 90 tablet 1    citalopram (CELEXA) 20 MG tablet TAKE 1 TABLET BY MOUTH EVERY DAY 90 tablet 4    citalopram (CELEXA) 20 MG tablet TAKE 1 TABLET DAILY with 10 mg to equal 30 mg daily 90 tablet 2    MAGNESIUM PO       metroNIDAZOLE (METROGEL) 0.75 % external gel       metroNIDAZOLE (METROGEL) 1 % external gel Apply topically daily      VITAMIN D, CHOLECALCIFEROL, PO Take 1,000 Units by mouth daily       No current facility-administered medications for this visit.     No Known Allergies      OBJECTIVE:     No vitals were obtained today due to virtual video visit.    Physical Exam  GENERAL: alert and no distress  EYES: Eyes grossly normal to inspection.  No discharge or erythema, or obvious scleral/conjunctival abnormalities.  RESP: No audible wheeze, cough, or visible cyanosis.    SKIN: Visible skin clear. No significant rash, abnormal pigmentation or lesions.  NEURO: Cranial nerves grossly intact.  Mentation and speech appropriate for age.  PSYCH: Appropriate affect, tone, and pace of words          ASSESSMENT/PLAN:                                                      Video duration: 26:30 minutes, additional 10min on DOS in chart review and documentation.       ICD-10-CM    1. Abnormal uterine bleeding (AUB)  N93.9 progesterone (PROMETRIUM) 200 MG capsule      2. Extremely dense tissue of both breasts on mammography  R92.343 MR Breast Bilateral w/o & w Contrast      3. Cyst of ovary, unspecified laterality  N83.209 US Transvaginal Pelvic Non-OB              Assessment & Plan  Abnormal uterine bleeding (AUB):  -Reviewed EMB pathology results- benign.  - Irregular bleeding pattern exacerbated by recent biopsy. Perimenopausal status likely contributing to  bleeding irregularity.  - Reviewed medication options to manage AUB symptoms, some with overalapping perimenopausal sx control such as insomnia tx with the oral prometrium.  She elects to trial oral progesterone medication, prometrium 200 mg at bedtime. Consider IUD placement if oral progesterone is ineffective or inconvenient. Follow-up later in the summer for annual check-up and reassessment of oral progesterone effectiveness.  - Risks and side effects: Potential breast tenderness, irregular spotting or bleeding if inconsistent with medication, may cause sleepiness.    Dense breast tissue:  - Extremely dense breast tissue identified, an independent risk factor for breast cancer.  - Schedule mammogram on July 2, 2025, or later. Consider MRI in December 2025, depending on insurance coverage and deductible status. Will order for her to be jaylan to schedule if desired.    Left ovarian Cyst:  -Interval decrease in size of Lov cyst based on MRI results reviewed with pt. Now measures 1.5cm. Is mildly completx, follow up US in 8-12wk suggested by radiologist.  Agree with recommendation for interval FU. Pt to schedule US for 2-3 mo.      Patient was informed of AI scribe and agreed to its use.      Asuncion Chapa Masters, DO  [facility] [place]

## 2025-07-01 ENCOUNTER — RESULTS FOLLOW-UP (OUTPATIENT)
Dept: OBGYN | Facility: CLINIC | Age: 55
End: 2025-07-01

## 2025-08-02 ENCOUNTER — HEALTH MAINTENANCE LETTER (OUTPATIENT)
Age: 55
End: 2025-08-02

## 2025-08-12 ENCOUNTER — ANCILLARY PROCEDURE (OUTPATIENT)
Dept: MAMMOGRAPHY | Facility: CLINIC | Age: 55
End: 2025-08-12
Attending: OBSTETRICS & GYNECOLOGY
Payer: COMMERCIAL

## 2025-08-12 DIAGNOSIS — Z12.31 VISIT FOR SCREENING MAMMOGRAM: ICD-10-CM

## 2025-08-12 PROCEDURE — 77067 SCR MAMMO BI INCL CAD: CPT | Mod: TC | Performed by: RADIOLOGY

## 2025-08-12 PROCEDURE — 77063 BREAST TOMOSYNTHESIS BI: CPT | Mod: TC | Performed by: RADIOLOGY

## (undated) RX ORDER — FENTANYL CITRATE 50 UG/ML
INJECTION, SOLUTION INTRAMUSCULAR; INTRAVENOUS
Status: DISPENSED
Start: 2022-07-18